# Patient Record
Sex: FEMALE | Race: ASIAN | NOT HISPANIC OR LATINO | Employment: FULL TIME | URBAN - METROPOLITAN AREA
[De-identification: names, ages, dates, MRNs, and addresses within clinical notes are randomized per-mention and may not be internally consistent; named-entity substitution may affect disease eponyms.]

---

## 2018-05-31 ENCOUNTER — OFFICE VISIT CONVERTED (OUTPATIENT)
Dept: FAMILY MEDICINE CLINIC | Facility: CLINIC | Age: 43
End: 2018-05-31
Attending: NURSE PRACTITIONER

## 2018-07-13 ENCOUNTER — CONVERSION ENCOUNTER (OUTPATIENT)
Dept: FAMILY MEDICINE CLINIC | Facility: CLINIC | Age: 43
End: 2018-07-13

## 2018-07-13 ENCOUNTER — OFFICE VISIT CONVERTED (OUTPATIENT)
Dept: FAMILY MEDICINE CLINIC | Facility: CLINIC | Age: 43
End: 2018-07-13
Attending: NURSE PRACTITIONER

## 2018-07-24 ENCOUNTER — CONVERSION ENCOUNTER (OUTPATIENT)
Dept: MAMMOGRAPHY | Facility: HOSPITAL | Age: 43
End: 2018-07-24

## 2019-02-08 ENCOUNTER — HOSPITAL ENCOUNTER (OUTPATIENT)
Dept: ONCOLOGY | Facility: HOSPITAL | Age: 44
Discharge: HOME | End: 2019-02-08
Attending: NURSE PRACTITIONER

## 2019-02-08 LAB
ALBUMIN SERPL-MCNC: 4.2 G/DL (ref 3.5–5)
ALBUMIN/GLOB SERPL: 1.4 {RATIO} (ref 1.4–2.6)
ALP SERPL-CCNC: 80 U/L (ref 42–98)
ALT SERPL-CCNC: 17 U/L (ref 10–40)
ANION GAP SERPL CALC-SCNC: 17 MMOL/L (ref 8–19)
AST SERPL-CCNC: 16 U/L (ref 15–50)
BASOPHILS # BLD AUTO: 0.03 10*3/UL (ref 0–0.2)
BASOPHILS NFR BLD AUTO: 0.35 % (ref 0–3)
BILIRUB SERPL-MCNC: 0.21 MG/DL (ref 0.2–1.3)
BUN SERPL-MCNC: 6 MG/DL (ref 5–25)
BUN/CREAT SERPL: 11 {RATIO} (ref 6–20)
CALCIUM SERPL-MCNC: 8.9 MG/DL (ref 8.7–10.4)
CHLORIDE SERPL-SCNC: 105 MMOL/L (ref 99–111)
CONV CO2: 23 MMOL/L (ref 22–32)
CONV TOTAL PROTEIN: 7.2 G/DL (ref 6.3–8.2)
CREAT UR-MCNC: 0.54 MG/DL (ref 0.5–0.9)
EOSINOPHIL # BLD AUTO: 0.35 10*3/UL (ref 0–0.7)
EOSINOPHIL # BLD AUTO: 3.58 % (ref 0–7)
ERYTHROCYTE [DISTWIDTH] IN BLOOD BY AUTOMATED COUNT: 11.9 % (ref 11.5–14.5)
GFR SERPLBLD BASED ON 1.73 SQ M-ARVRAT: >60 ML/MIN/{1.73_M2}
GLOBULIN UR ELPH-MCNC: 3 G/DL (ref 2–3.5)
GLUCOSE SERPL-MCNC: 95 MG/DL (ref 65–99)
HBA1C MFR BLD: 13.5 G/DL (ref 12–16)
HCT VFR BLD AUTO: 39.5 % (ref 37–47)
LDH SERPL-CCNC: 161 U/L (ref 120–240)
LYMPHOCYTES # BLD AUTO: 3.49 10*3/UL (ref 1–5)
MCH RBC QN AUTO: 30.3 PG (ref 27–31)
MCHC RBC AUTO-ENTMCNC: 34.3 G/DL (ref 33–37)
MCV RBC AUTO: 88.4 FL (ref 81–99)
MONOCYTES # BLD AUTO: 0.58 10*3/UL (ref 0.2–1.2)
MONOCYTES NFR BLD AUTO: 5.99 % (ref 3–10)
NEUTROPHILS # BLD AUTO: 5.17 10*3/UL (ref 2–8)
NEUTROPHILS NFR BLD AUTO: 53.7 % (ref 30–85)
NRBC BLD AUTO-RTO: 0 % (ref 0–0.01)
OSMOLALITY SERPL CALC.SUM OF ELEC: 289 MOSM/KG (ref 273–304)
PLATELET # BLD AUTO: 366 10*3/UL (ref 130–400)
PMV BLD AUTO: 7 FL (ref 7.4–10.4)
POTASSIUM SERPL-SCNC: 3.6 MMOL/L (ref 3.5–5.3)
RBC # BLD AUTO: 4.47 10*6/UL (ref 4.2–5.4)
SODIUM SERPL-SCNC: 141 MMOL/L (ref 135–147)
VARIANT LYMPHS NFR BLD MANUAL: 36.3 % (ref 20–45)
WBC # BLD AUTO: 9.62 10*3/UL (ref 4.8–10.8)

## 2019-07-31 ENCOUNTER — HOSPITAL ENCOUNTER (OUTPATIENT)
Dept: FAMILY MEDICINE CLINIC | Facility: CLINIC | Age: 44
Discharge: HOME | End: 2019-07-31
Attending: NURSE PRACTITIONER

## 2019-07-31 ENCOUNTER — OFFICE VISIT CONVERTED (OUTPATIENT)
Dept: FAMILY MEDICINE CLINIC | Facility: CLINIC | Age: 44
End: 2019-07-31
Attending: NURSE PRACTITIONER

## 2019-07-31 LAB
ALBUMIN SERPL-MCNC: 4.3 G/DL (ref 3.5–5)
ALBUMIN/GLOB SERPL: 1.5 {RATIO} (ref 1.4–2.6)
ALP SERPL-CCNC: 107 U/L (ref 42–98)
ALT SERPL-CCNC: 59 U/L (ref 10–40)
ANION GAP SERPL CALC-SCNC: 22 MMOL/L (ref 8–19)
AST SERPL-CCNC: 38 U/L (ref 15–50)
BASOPHILS # BLD AUTO: 0.04 10*3/UL (ref 0–0.2)
BASOPHILS NFR BLD AUTO: 0.4 % (ref 0–3)
BILIRUB SERPL-MCNC: 0.37 MG/DL (ref 0.2–1.3)
BUN SERPL-MCNC: 9 MG/DL (ref 5–25)
BUN/CREAT SERPL: 15 {RATIO} (ref 6–20)
CALCIUM SERPL-MCNC: 9.1 MG/DL (ref 8.7–10.4)
CHLORIDE SERPL-SCNC: 101 MMOL/L (ref 99–111)
CHOLEST SERPL-MCNC: 225 MG/DL (ref 107–200)
CHOLEST/HDLC SERPL: 5.9 {RATIO} (ref 3–6)
CONV ABS IMM GRAN: 0.02 10*3/UL (ref 0–0.2)
CONV CO2: 22 MMOL/L (ref 22–32)
CONV IMMATURE GRAN: 0.2 % (ref 0–1.8)
CONV TOTAL PROTEIN: 7.2 G/DL (ref 6.3–8.2)
CREAT UR-MCNC: 0.6 MG/DL (ref 0.5–0.9)
DEPRECATED RDW RBC AUTO: 43.7 FL (ref 36.4–46.3)
EOSINOPHIL # BLD AUTO: 0.39 10*3/UL (ref 0–0.7)
EOSINOPHIL # BLD AUTO: 4.4 % (ref 0–7)
ERYTHROCYTE [DISTWIDTH] IN BLOOD BY AUTOMATED COUNT: 13.2 % (ref 11.7–14.4)
GFR SERPLBLD BASED ON 1.73 SQ M-ARVRAT: >60 ML/MIN/{1.73_M2}
GLOBULIN UR ELPH-MCNC: 2.9 G/DL (ref 2–3.5)
GLUCOSE SERPL-MCNC: 89 MG/DL (ref 65–99)
HBA1C MFR BLD: 12.5 G/DL (ref 12–16)
HCT VFR BLD AUTO: 38 % (ref 37–47)
HDLC SERPL-MCNC: 38 MG/DL (ref 40–60)
IRON SATN MFR SERPL: 24 % (ref 20–55)
IRON SERPL-MCNC: 87 UG/DL (ref 60–170)
LDLC SERPL CALC-MCNC: 165 MG/DL (ref 70–100)
LYMPHOCYTES # BLD AUTO: 3.16 10*3/UL (ref 1–5)
MCH RBC QN AUTO: 29.7 PG (ref 27–31)
MCHC RBC AUTO-ENTMCNC: 32.9 G/DL (ref 33–37)
MCV RBC AUTO: 90.3 FL (ref 81–99)
MONOCYTES # BLD AUTO: 0.61 10*3/UL (ref 0.2–1.2)
MONOCYTES NFR BLD AUTO: 6.9 % (ref 3–10)
NEUTROPHILS # BLD AUTO: 4.68 10*3/UL (ref 2–8)
NEUTROPHILS NFR BLD AUTO: 52.6 % (ref 30–85)
NRBC CBCN: 0 % (ref 0–0.7)
OSMOLALITY SERPL CALC.SUM OF ELEC: 290 MOSM/KG (ref 273–304)
PLATELET # BLD AUTO: 360 10*3/UL (ref 130–400)
PMV BLD AUTO: 10.7 FL (ref 9.4–12.3)
POTASSIUM SERPL-SCNC: 3.9 MMOL/L (ref 3.5–5.3)
RBC # BLD AUTO: 4.21 10*6/UL (ref 4.2–5.4)
SODIUM SERPL-SCNC: 141 MMOL/L (ref 135–147)
T4 FREE SERPL-MCNC: 1.1 NG/DL (ref 0.9–1.8)
TIBC SERPL-MCNC: 365 UG/DL (ref 245–450)
TRANSFERRIN SERPL-MCNC: 255 MG/DL (ref 250–380)
TRIGL SERPL-MCNC: 110 MG/DL (ref 40–150)
TSH SERPL-ACNC: 2.29 M[IU]/L (ref 0.27–4.2)
VARIANT LYMPHS NFR BLD MANUAL: 35.5 % (ref 20–45)
VLDLC SERPL-MCNC: 22 MG/DL (ref 5–37)
WBC # BLD AUTO: 8.9 10*3/UL (ref 4.8–10.8)

## 2019-08-07 ENCOUNTER — HOSPITAL ENCOUNTER (OUTPATIENT)
Dept: GENERAL RADIOLOGY | Facility: HOSPITAL | Age: 44
Discharge: HOME | End: 2019-08-07
Attending: NURSE PRACTITIONER

## 2019-08-09 ENCOUNTER — HOSPITAL ENCOUNTER (OUTPATIENT)
Dept: GENERAL RADIOLOGY | Facility: HOSPITAL | Age: 44
Discharge: HOME | End: 2019-08-09
Attending: NURSE PRACTITIONER

## 2019-08-14 ENCOUNTER — OFFICE VISIT CONVERTED (OUTPATIENT)
Dept: ONCOLOGY | Facility: HOSPITAL | Age: 44
End: 2019-08-14
Attending: NURSE PRACTITIONER

## 2019-08-14 ENCOUNTER — HOSPITAL ENCOUNTER (OUTPATIENT)
Dept: ONCOLOGY | Facility: HOSPITAL | Age: 44
Discharge: HOME | End: 2019-08-14
Attending: NURSE PRACTITIONER

## 2019-08-20 ENCOUNTER — HOSPITAL ENCOUNTER (OUTPATIENT)
Dept: MRI IMAGING | Facility: HOSPITAL | Age: 44
Discharge: HOME | End: 2019-08-20
Attending: NURSE PRACTITIONER

## 2019-09-16 ENCOUNTER — HOSPITAL ENCOUNTER (OUTPATIENT)
Dept: GENERAL RADIOLOGY | Facility: HOSPITAL | Age: 44
Discharge: HOME | End: 2019-09-16
Attending: NURSE PRACTITIONER

## 2019-10-31 ENCOUNTER — HOSPITAL ENCOUNTER (OUTPATIENT)
Dept: LAB | Facility: HOSPITAL | Age: 44
Discharge: HOME | End: 2019-10-31
Attending: NURSE PRACTITIONER

## 2019-10-31 LAB
CHOLEST SERPL-MCNC: 223 MG/DL (ref 107–200)
CHOLEST/HDLC SERPL: 5.6 {RATIO} (ref 3–6)
HDLC SERPL-MCNC: 40 MG/DL (ref 40–60)
LDLC SERPL CALC-MCNC: 154 MG/DL (ref 70–100)
TRIGL SERPL-MCNC: 144 MG/DL (ref 40–150)
VLDLC SERPL-MCNC: 29 MG/DL (ref 5–37)

## 2020-02-14 ENCOUNTER — HOSPITAL ENCOUNTER (OUTPATIENT)
Dept: LAB | Facility: HOSPITAL | Age: 45
Discharge: HOME | End: 2020-02-14
Attending: NURSE PRACTITIONER

## 2020-02-14 LAB
ALBUMIN SERPL-MCNC: 4 G/DL (ref 3.5–5)
ALBUMIN/GLOB SERPL: 1.6 {RATIO} (ref 1.4–2.6)
ALP SERPL-CCNC: 84 U/L (ref 42–98)
ALT SERPL-CCNC: 18 U/L (ref 10–40)
ANION GAP SERPL CALC-SCNC: 15 MMOL/L (ref 8–19)
AST SERPL-CCNC: 16 U/L (ref 15–50)
BASOPHILS # BLD AUTO: 0.02 10*3/UL (ref 0–0.2)
BASOPHILS NFR BLD AUTO: 0.2 % (ref 0–3)
BILIRUB SERPL-MCNC: 0.38 MG/DL (ref 0.2–1.3)
BUN SERPL-MCNC: 10 MG/DL (ref 5–25)
BUN/CREAT SERPL: 18 {RATIO} (ref 6–20)
CALCIUM SERPL-MCNC: 8.8 MG/DL (ref 8.7–10.4)
CHLORIDE SERPL-SCNC: 102 MMOL/L (ref 99–111)
CONV ABS IMM GRAN: 0.02 10*3/UL (ref 0–0.2)
CONV CO2: 24 MMOL/L (ref 22–32)
CONV IMMATURE GRAN: 0.2 % (ref 0–1.8)
CONV TOTAL PROTEIN: 6.5 G/DL (ref 6.3–8.2)
CREAT UR-MCNC: 0.57 MG/DL (ref 0.5–0.9)
DEPRECATED RDW RBC AUTO: 45.1 FL (ref 36.4–46.3)
EOSINOPHIL # BLD AUTO: 0.34 10*3/UL (ref 0–0.7)
EOSINOPHIL # BLD AUTO: 3.9 % (ref 0–7)
ERYTHROCYTE [DISTWIDTH] IN BLOOD BY AUTOMATED COUNT: 13.5 % (ref 11.7–14.4)
GFR SERPLBLD BASED ON 1.73 SQ M-ARVRAT: >60 ML/MIN/{1.73_M2}
GLOBULIN UR ELPH-MCNC: 2.5 G/DL (ref 2–3.5)
GLUCOSE SERPL-MCNC: 96 MG/DL (ref 65–99)
HCT VFR BLD AUTO: 39 % (ref 37–47)
HGB BLD-MCNC: 12.5 G/DL (ref 12–16)
LDH SERPL-CCNC: 134 U/L (ref 120–240)
LYMPHOCYTES # BLD AUTO: 3.45 10*3/UL (ref 1–5)
LYMPHOCYTES NFR BLD AUTO: 39.4 % (ref 20–45)
MCH RBC QN AUTO: 29 PG (ref 27–31)
MCHC RBC AUTO-ENTMCNC: 32.1 G/DL (ref 33–37)
MCV RBC AUTO: 90.5 FL (ref 81–99)
MONOCYTES # BLD AUTO: 0.64 10*3/UL (ref 0.2–1.2)
MONOCYTES NFR BLD AUTO: 7.3 % (ref 3–10)
NEUTROPHILS # BLD AUTO: 4.29 10*3/UL (ref 2–8)
NEUTROPHILS NFR BLD AUTO: 49 % (ref 30–85)
NRBC CBCN: 0 % (ref 0–0.7)
OSMOLALITY SERPL CALC.SUM OF ELEC: 283 MOSM/KG (ref 273–304)
PLATELET # BLD AUTO: 328 10*3/UL (ref 130–400)
PMV BLD AUTO: 10.6 FL (ref 9.4–12.3)
POTASSIUM SERPL-SCNC: 4.1 MMOL/L (ref 3.5–5.3)
RBC # BLD AUTO: 4.31 10*6/UL (ref 4.2–5.4)
SODIUM SERPL-SCNC: 137 MMOL/L (ref 135–147)
WBC # BLD AUTO: 8.76 10*3/UL (ref 4.8–10.8)

## 2020-02-17 ENCOUNTER — HOSPITAL ENCOUNTER (OUTPATIENT)
Dept: ONCOLOGY | Facility: HOSPITAL | Age: 45
Discharge: HOME | End: 2020-02-17
Attending: NURSE PRACTITIONER

## 2020-02-17 ENCOUNTER — OFFICE VISIT CONVERTED (OUTPATIENT)
Dept: ONCOLOGY | Facility: HOSPITAL | Age: 45
End: 2020-02-17
Attending: NURSE PRACTITIONER

## 2020-03-16 ENCOUNTER — HOSPITAL ENCOUNTER (OUTPATIENT)
Dept: GENERAL RADIOLOGY | Facility: HOSPITAL | Age: 45
Discharge: HOME | End: 2020-03-16
Attending: NURSE PRACTITIONER

## 2020-07-27 ENCOUNTER — HOSPITAL ENCOUNTER (OUTPATIENT)
Dept: FAMILY MEDICINE CLINIC | Facility: CLINIC | Age: 45
Discharge: HOME | End: 2020-07-27
Attending: NURSE PRACTITIONER

## 2020-07-27 ENCOUNTER — OFFICE VISIT CONVERTED (OUTPATIENT)
Dept: FAMILY MEDICINE CLINIC | Facility: CLINIC | Age: 45
End: 2020-07-27
Attending: NURSE PRACTITIONER

## 2020-07-27 LAB
ALBUMIN SERPL-MCNC: 4.2 G/DL (ref 3.5–5)
ALBUMIN/GLOB SERPL: 1.4 {RATIO} (ref 1.4–2.6)
ALP SERPL-CCNC: 103 U/L (ref 42–98)
ALT SERPL-CCNC: 21 U/L (ref 10–40)
ANION GAP SERPL CALC-SCNC: 17 MMOL/L (ref 8–19)
AST SERPL-CCNC: 20 U/L (ref 15–50)
BILIRUB SERPL-MCNC: 0.34 MG/DL (ref 0.2–1.3)
BUN SERPL-MCNC: 8 MG/DL (ref 5–25)
BUN/CREAT SERPL: 12 {RATIO} (ref 6–20)
CALCIUM SERPL-MCNC: 9.6 MG/DL (ref 8.7–10.4)
CHLORIDE SERPL-SCNC: 102 MMOL/L (ref 99–111)
CHOLEST SERPL-MCNC: 210 MG/DL (ref 107–200)
CHOLEST/HDLC SERPL: 6 {RATIO} (ref 3–6)
CONV CO2: 26 MMOL/L (ref 22–32)
CONV TOTAL PROTEIN: 7.3 G/DL (ref 6.3–8.2)
CREAT UR-MCNC: 0.67 MG/DL (ref 0.5–0.9)
GFR SERPLBLD BASED ON 1.73 SQ M-ARVRAT: >60 ML/MIN/{1.73_M2}
GLOBULIN UR ELPH-MCNC: 3.1 G/DL (ref 2–3.5)
GLUCOSE SERPL-MCNC: 86 MG/DL (ref 65–99)
HDLC SERPL-MCNC: 35 MG/DL (ref 40–60)
LDLC SERPL CALC-MCNC: 152 MG/DL (ref 70–100)
OSMOLALITY SERPL CALC.SUM OF ELEC: 290 MOSM/KG (ref 273–304)
POTASSIUM SERPL-SCNC: 3.9 MMOL/L (ref 3.5–5.3)
SODIUM SERPL-SCNC: 141 MMOL/L (ref 135–147)
TRIGL SERPL-MCNC: 116 MG/DL (ref 40–150)
VLDLC SERPL-MCNC: 23 MG/DL (ref 5–37)

## 2020-09-16 ENCOUNTER — HOSPITAL ENCOUNTER (OUTPATIENT)
Dept: GENERAL RADIOLOGY | Facility: HOSPITAL | Age: 45
Discharge: HOME | End: 2020-09-16
Attending: NURSE PRACTITIONER

## 2020-09-21 ENCOUNTER — OFFICE VISIT CONVERTED (OUTPATIENT)
Dept: ORTHOPEDIC SURGERY | Facility: CLINIC | Age: 45
End: 2020-09-21
Attending: STUDENT IN AN ORGANIZED HEALTH CARE EDUCATION/TRAINING PROGRAM

## 2020-10-30 ENCOUNTER — OFFICE VISIT CONVERTED (OUTPATIENT)
Dept: ORTHOPEDIC SURGERY | Facility: CLINIC | Age: 45
End: 2020-10-30
Attending: STUDENT IN AN ORGANIZED HEALTH CARE EDUCATION/TRAINING PROGRAM

## 2020-11-18 ENCOUNTER — OFFICE VISIT CONVERTED (OUTPATIENT)
Dept: ORTHOPEDIC SURGERY | Facility: CLINIC | Age: 45
End: 2020-11-18
Attending: STUDENT IN AN ORGANIZED HEALTH CARE EDUCATION/TRAINING PROGRAM

## 2021-02-04 ENCOUNTER — OFFICE VISIT CONVERTED (OUTPATIENT)
Dept: FAMILY MEDICINE CLINIC | Facility: CLINIC | Age: 46
End: 2021-02-04
Attending: NURSE PRACTITIONER

## 2021-02-18 ENCOUNTER — OFFICE VISIT CONVERTED (OUTPATIENT)
Dept: ONCOLOGY | Facility: HOSPITAL | Age: 46
End: 2021-02-18
Attending: NURSE PRACTITIONER

## 2021-05-10 NOTE — H&P
History and Physical      Patient Name: Carol Giron   Patient ID: [id number]   Sex: Female   YOB: 1975    Primary Care Provider: Sindy JACQUES   Referring Provider: Tisha Lala PA-C    Visit Date: September 21, 2020    Provider: Clinton Albarran MD   Location: [facility]   Location Address: [address] [place] [address]  [id number]   Location Phone: [phone]          Chief Complaint  · left elbow pain      History Of Present Illness  Carol Giron is a 45 year old /White female who presents today to [facility].      Carol presents today with approximately 2-month history of left elbow pain.  She denies any injury prior to the onset of her pain.  She is right-hand dominant.  She does predominantly computer work and denies any recent changes in her work or job requirements.  Her pain is located over the lateral aspect of the left elbow/lateral epicondyle.  She initially experienced pain primarily with activities but is now having pain at rest as well.  She saw her primary care doctor.  She was prescribed topical Voltaren gel and given a offloading type brace.  She was also given a steroid Dosepak initially because she was also having wrist pain/carpal tunnel type symptoms.  Her wrist pain has improved but her elbow pain has not.  She tries to avoid anti-inflammatories because she has GI issues in the past.  She has not had any injections.  She denies any radiating or radicular type symptoms.       Past Medical History  Allergic rhinitis, chronic; Bitten or stung by nonvenomous insect and other nonvenomous arthropods, initial encounter; History of melanoma; Hyperlipidemia; IBS (irritable bowel syndrome); Limb Pain; Limb Swelling; Multinodular goiter; Pap smear for cervical cancer screening; Screening Mammogram         Past Surgical History  Arthroscopic debridement of meniscus of right knee; Colonoscopy; Cyst Removal; Tubal ligation         Medication  "List  cetirizine 10 mg oral tablet; hydrochlorothiazide 25 mg oral tablet; Medrol (Ramu) 4 mg oral tablets,dose pack; Singulair 10 mg oral tablet; Voltaren 1 % topical gel         Allergy List  NO KNOWN DRUG ALLERGIES       Allergies Reconciled  Family Medical History  Diabetes, unspecified type; Cardiovascular disease         Social History  Alcohol (Current some day); Caffeine (Current every day); Second hand smoke exposure (Current some day); Tobacco (Current every day)         Immunizations  Name Date Admin   Hepatitis A    Influenza          Review of Systems  · Constitutional  o Denies  o : fever, chills, weight loss  · Cardiovascular  o Denies  o : chest pain, shortness of breath  · Gastrointestinal  o Denies  o : liver disease, heartburn, nausea, blood in stools  · Genitourinary  o Denies  o : painful urination, blood in urine  · Integument  o Denies  o : rash, itching  · Neurologic  o Denies  o : headache, weakness, loss of consciousness  · Musculoskeletal  o Admits  o : Left elbow pain  · Psychiatric  o Denies  o : drug/alcohol addiction, anxiety, depression      Vitals  Date Time BP Position Site L\R Cuff Size HR RR TEMP (F) WT  HT  BMI kg/m2 BSA m2 O2 Sat        09/21/2020 10:16 AM      66 - R   182lbs 5oz 5'  4\" 31.29 1.93 98 %          Physical Examination  · Constitutional  o Appearance  o : well developed, well-nourished, no obvious deformities present  · Head and Face  o Head  o :   § Inspection  § : normocephalic  o Face  o :   § Inspection  § : no facial lesions  · Eyes  o Conjunctivae  o : conjunctivae normal  o Sclerae  o : sclerae white  · Ears, Nose, Mouth and Throat  o Ears  o :   § External Ears  § : appearance within normal limits  § Hearing  § : intact  o Nose  o :   § External Nose  § : appearance normal  · Neck  o Inspection/Palpation  o : normal appearance  o Range of Motion  o : full range of motion  · Respiratory  o Respiratory Effort  o : breathing unlabored  o Inspection of " Chest  o : normal appearance  o Auscultation of Lungs  o : no audible wheezing or rales  · Cardiovascular  o Heart  o : regular rate  · Gastrointestinal  o Abdominal Examination  o : soft and non-tender  · Skin and Subcutaneous Tissue  o General Inspection  o : intact, no rashes  · Psychiatric  o General  o : Alert and oriented x3  o Judgement and Insight  o : judgment and insight intact  o Mood and Affect  o : mood normal, affect appropriate  · In Office Procedures  o View  o : AP/LATERAL  o Site  o : left, elbow   o Indication  o : left elbow pain  o Study  o : X-rays ordered, taken in the office, and reviewed today.  o Xray  o : 2 views of the left elbow obtained and interpreted by me on this date. Well-maintained joint space on the AP. The lateral view is not rotated appropriately to visualize the joint space. No fractures or dislocations appreciated.  o Comparative Data  o : No comparative data found  · Left Elbow  o Left Elbow  o : No swelling or bruising noted. Tender to palpation over the lateral epicondyle and common extensor tendon. Nontender over the olecranon, medial epicondyle, or radial head. Full elbow motion with flexion extension pronation supination. No pain with palpation or gentle motion about the wrist. Pain/weakness with resisted wrist extension localized over the lateral epicondyle. Sensation intact light touch in the median radial and ulnar nerve distributions distally. AIN PIN ulnar motor function intact. Palpable radial pulse. No pain with passive shoulder motion.          Assessment  · Lateral epicondylitis     726.32/M77.10  Carol has left lateral epicondylitis. This is been present for 2 months and not responded to a steroid Dosepak, activity modification, topical Voltaren gel, and a offloading strap. No arthritic change or other pathology is noted on her left elbow x-rays obtained today. We discussed that nonoperative treatment measures should help improve her symptoms. These consist  of continued anti-inflammatory use, physical therapy, and possibly an injection. She would like to try physical therapy first. We will prescribe that for her today. She would like to follow-up on an as-needed basis and will call me if there are any issues going forward.  · Left elbow pain     719.42/M25.522      Plan  · Orders  o Elbow (Left) 2 views X-Ray [facility] ([id number]) - 719.42/M25.522 - 09/21/2020  · Medications  o Medications have been Reconciled  o Transition of Care or Provider Policy  · Instructions  o X-rays reviewed by Dr. Albarran.  o Reviewed the patient's Past Medical, Social, and Family history as well as the ROS at today's visit, no changes.  o Call or return if worsening symptoms.  o Follow Up PRN.  · Disposition  o Call or Return if symptoms worsen or persist.            Electronically Signed by: Clinton Albarran MD -Author on September 21, 2020 04:12:43 PM

## 2021-05-13 NOTE — PROGRESS NOTES
Progress Note      Patient Name: Carol Giron   Patient ID: [id number]   Sex: Female   YOB: 1975    Primary Care Provider: Sindy JACQUES   Referring Provider: Tisha Lala PA-C    Visit Date: October 30, 2020    Provider: Clinton Albarran MD   Location: [facility]   Location Address: [address] [place] [address]  [id number]   Location Phone: [phone]          Chief Complaint  · Left elbow pain       History Of Present Illness  Carol Giron is a 45 year old /White female who presents today to [facility].      Carol returns today for follow-up of her left elbow.  She is still having lateral sided elbow pain.  She has been doing physical therapy.  She states some of the physical therapy stretching and strengthening exercises are helpful.  However the elbow feels very irritated with other activities.  She does describe new pain more posterior and lateral in addition to her continued pain more localized at the lateral epicondyle.  She also feels some tightness as the elbows transitions to extension.  She denies any numbness or tingling.  She denies any pain in the forearm, wrist, hand.  She has not been taking any anti-inflammatory medications.           Past Medical History  Allergic rhinitis, chronic; Bitten or stung by nonvenomous insect and other nonvenomous arthropods, initial encounter; History of melanoma; Hyperlipidemia; IBS (irritable bowel syndrome); Limb Pain; Limb Swelling; Multinodular goiter; Pap smear for cervical cancer screening; Screening Mammogram         Past Surgical History  Arthroscopic debridement of meniscus of right knee; Colonoscopy; Cyst Removal; Tubal ligation         Medication List  cetirizine 10 mg oral tablet; hydrochlorothiazide 25 mg oral tablet; Medrol (Ramu) 4 mg oral tablets,dose pack; Mobic 7.5 mg oral tablet; Singulair 10 mg oral tablet; Voltaren 1 % topical gel         Allergy List  NO KNOWN DRUG ALLERGIES  "      Allergies Reconciled  Family Medical History  Diabetes, unspecified type; Cardiovascular disease         Social History  Alcohol (Current some day); Caffeine (Current every day); Second hand smoke exposure (Current some day); Tobacco (Current every day)         Immunizations  Name Date Admin   Hepatitis A 05/31/2018   Influenza 09/26/2016         Review of Systems  · Constitutional  o Denies  o : fever, chills, weight loss  · Cardiovascular  o Denies  o : chest pain, shortness of breath  · Gastrointestinal  o Denies  o : liver disease, heartburn, nausea, blood in stools  · Genitourinary  o Denies  o : painful urination, blood in urine  · Integument  o Denies  o : rash, itching  · Neurologic  o Denies  o : headache, weakness, loss of consciousness  · Musculoskeletal  o Admits  o : Left elbow pain  · Psychiatric  o Denies  o : drug/alcohol addiction, anxiety, depression      Vitals  Date Time BP Position Site L\R Cuff Size HR RR TEMP (F) WT  HT  BMI kg/m2 BSA m2 O2 Sat FR L/min FiO2        10/30/2020 01:44 PM      82 - R   182lbs 4oz 5'  3\" 32.28 1.92 98 %            Physical Examination  · Constitutional  o Appearance  o : well developed, well-nourished, no obvious deformities present  · Head and Face  o Head  o :   § Inspection  § : normocephalic  o Face  o :   § Inspection  § : no facial lesions  · Eyes  o Conjunctivae  o : conjunctivae normal  o Sclerae  o : sclerae white  · Ears, Nose, Mouth and Throat  o Ears  o :   § External Ears  § : appearance within normal limits  § Hearing  § : intact  o Nose  o :   § External Nose  § : appearance normal  · Neck  o Inspection/Palpation  o : normal appearance  o Range of Motion  o : full range of motion  · Respiratory  o Respiratory Effort  o : breathing unlabored  o Inspection of Chest  o : normal appearance  o Auscultation of Lungs  o : no audible wheezing or rales  · Cardiovascular  o Heart  o : regular rate  · Gastrointestinal  o Abdominal Examination  o : soft " and non-tender  · Skin and Subcutaneous Tissue  o General Inspection  o : intact, no rashes  · Psychiatric  o General  o : Alert and oriented x3  o Judgement and Insight  o : judgment and insight intact  o Mood and Affect  o : mood normal, affect appropriate  · Left Arm  o Inspection  o : No swelling or bruising. Point tender to palpation of the lateral epicondyle and adjacent common extensor tendon. Point tenderness to palpation over the joint capsule/radial head. Nontender over the olecranon. Nontender over the medial epicondyle. She demonstrates full elbow extension, but there is pain and hesitancy through the terminal 5 to 10 degrees of motion. She is able to demonstrate full elbow flexion without pain. Full elbow pronation supination is also demonstrated but this is again painful through the terminal arc of motion. No mechanical symptoms appreciable with passive motion. Sensation intact light distally in the median, radial, ulnar nerve distributions. There is pain localized at the lateral epicondyle with resisted wrist extension. Motor function otherwise intact in the AIN, PIN, ulnar nerve distributions. She has palpable radial pulse and well-perfused hand.          Assessment  · Lateral epicondylitis     726.32/M77.10  · Left elbow pain     719.42/M25.522      Plan  · Medications  o Medications have been Reconciled  o Transition of Care or Provider Policy  · Instructions  o Reviewed the patient's Past Medical, Social, and Family history as well as the ROS at today's visit, no changes.  o Call or return if worsening symptoms.  o Follow Up in 2 weeks.  o The above service was scribed by Vandana Bell on my behalf and I attest to the accuracy of the note. cb  shirin Medina has left elbow lateral epicondylitis. She also seems to have developed left elbow synovitis and more joint related pain during her physical therapy. Given her increasing pain and new symptoms with therapy, I think it is best that we hold off on  any further formal physical therapy at this point. I instructed her to continue her light range of motion type exercises. She should ice, and elevate the elbow. I will prescribe Mobic 7.5 mg daily to help with her inflammation. I reviewed her previously obtained x-rays today and these did not reveal any degenerative pathology within the elbow. She has not had any trauma. We discussed this brief period of rest and supportive measures as noted above. I will follow-up with her in 2 weeks time for repeat evaluation. She was agreeable with the plan.            Electronically Signed by: Clinton Albarran MD -Author on October 30, 2020 05:46:26 PM

## 2021-05-13 NOTE — PROGRESS NOTES
Progress Note      Patient Name: Carol Giron   Patient ID: [id number]   Sex: Female   YOB: 1975    Primary Care Provider: Sindy JACQUES   Referring Provider: Tisha Lala PA-C    Visit Date: July 27, 2020    Provider: GEORGIE Ponce   Location: [facility]   Location Address: [address] [place] [address]  [id number]   Location Phone: [phone]          Chief Complaint  · 1 Year Follow Up      History Of Present Illness  Carol Giron is a 45 year old /White female who presents for evaluation and treatment of:      1 Year follow up   Lab orders pending to check CMP/Lipid  Last lab work done 7/31/19  Med refills needed    edema in RLE improved with water pill. pt has hx of melanoma of RLE with lymph node removal.    pt c/o wrist and elbow pain on right.     flu shot- 10/17/19  Mammo- Dx mammo- 3/16/20 and 6 month repeat Dx Mammo of Lt breast scheduled 9/16/20  Colon- 12/8/06  Pap- 8/2019 Dr. Wall           Past Medical History  Disease Name Date Onset Notes   Allergic rhinitis, chronic --  --    Bitten or stung by nonvenomous insect and other nonvenomous arthropods, initial encounter 05/31/2018 --    History of melanoma 07/13/2018 --    Hyperlipidemia --  --    IBS (irritable bowel syndrome) --  --    Limb Pain --  --    Limb Swelling --  --    Multinodular goiter 07/13/2018 --    Pap smear for cervical cancer screening 8/2019 Dr. Wall   Screening Mammogram 8/24/18 Dx Mammo of Lt Breast 3/16/20         Past Surgical History  Procedure Name Date Notes   Arthroscopic debridement of meniscus of right knee --  --    Colonoscopy 12/8/2006 --    Cyst Removal --  --    Tubal ligation --  --          Medication List  Name Date Started Instructions   hydrochlorothiazide 25 mg oral tablet 07/27/2020 take 1 tablet by oral route once a day (in the morning) for 30 days   loratadine 10 mg oral tablet  take 1 tablet (10 mg) by oral route once daily    Singulair 10 mg oral tablet 07/27/2020 take 1 tablet (10 mg) by oral route once daily at bedtime   Voltaren 1 % topical gel 07/27/2020 apply 2 grams to the affected area(s) by topical route 4 times per day         Allergy List  Allergen Name Date Reaction Notes   NO KNOWN DRUG ALLERGIES --  --  --          Family Medical History  Disease Name Relative/Age Notes   Diabetes, unspecified type Grandmother (paternal)/   Grandmother (paternal)   Cardiovascular disease Grandfather (paternal)/  Grandmother (paternal)/   unknown age later in life 60's         Social History  Finding Status Start/Stop Quantity Notes   Alcohol Current some day 0/0 --  drinks rarely   Caffeine Current every day 0/0 --  drinks regularly; coffee; 3-4 times per day   Second hand smoke exposure Current some day 0/0 --  yes   Tobacco Current every day 16/0 1/2 ppd current everyday smoker; started smoking at age 15         Immunizations  NameDate Admin Mfg Trade Name Lot Number Route Inj VIS Given VIS Publication   Hepatitis A05/31/2018 SK[employer] HAVRIX-ADULT  IM LA 05/31/2018 10/25/2011   Comments: Pt tolerated injection well with no adverse reaction.   Nnaelabon76/26/2016 SKB Fluarix, quadrivalent, preservative free [id number] IM LD 09/26/2016 07/02/2012   Comments: tolerated well. Left office in stable condition.         Review of Systems  · Constitutional  o Denies  o : fatigue, fever, weight gain, weight loss, chills  · Cardiovascular  o Admits  o : edema (swelling)  o Denies  o : chest Pain, palpitations  · Respiratory  o Denies  o : frequent cough, shortness of breath  · Gastrointestinal  o Admits  o : constipation  o Denies  o : nausea, vomiting, changes in bowel habits  · Genitourinary  o Denies  o : dysuria, urinary frequency, urinary urgency, polyuria  · Neurologic  o Denies  o : headache, tingling or numbness, dizziness  · Musculoskeletal  o Admits  o : elbow pain, wrist pain  o Denies  o : joint pain, myalgias  · Psychiatric  o Denies  o :  "mood changes, memory changes, SI/HI  · Allergic-Immunologic  o Admits  o : seasonal allergies  o Denies  o : eczema, urticaria      Vitals  Date Time BP Position Site L\R Cuff Size HR RR TEMP (F) WT  HT  BMI kg/m2 BSA m2 O2 Sat HC       07/31/2019 07:42 /65 Sitting    78 - R 20 98.1 179lbs 0oz 5'  4\" 30.72 1.91 98 %    07/31/2019 07:42 AM         178lbs 0oz 5'  4\" 30.55 1.91     07/27/2020 10:42 /67 Sitting    55 - R 18 98.7 180lbs 8oz 5'  4\" 30.98 1.92 98 %          Physical Examination  · Constitutional  o Appearance  o : well-nourished, in no acute distress  · Eyes  o Conjunctivae  o : conjunctivae normal  o Sclerae  o : sclerae white  o Pupils and Irises  o : pupils equal and round  o Eyelids/Ocular Adnexae  o : eyelid appearance normal, no exudates present  · Neck  o Inspection/Palpation  o : normal appearance, no masses or tenderness, trachea midline  o Range of Motion  o : cervical range of motion within normal limits  o Thyroid  o : gland size normal, nontender, no nodules or masses present on palpation  · Respiratory  o Respiratory Effort  o : breathing unlabored  o Inspection of Chest  o : normal appearance  o Auscultation of Lungs  o : normal breath sounds throughout inspiration and expiration  · Cardiovascular  o Heart  o :   § Auscultation of Heart  § : regular rate and rhythm, no murmurs, gallops or rubs  o Peripheral Vascular System  o :   § Carotid Arteries  § : normal pulses bilaterally, no bruits present  § Extremities  § : no clubbing or edema  · Gastrointestinal  o Abdominal Examination  o : abdomen nontender to palpation, tone normal without rigidity or guarding, no masses present, bowel sounds present  · Musculoskeletal  o Spine  o :   § Inspection/Palpation  § : no spinal tenderness, scoliosis or kyphosis present  § Range of Motion  § : spine range of motion normal  o Right Upper Extremity  o :   § Inspection/Palpation  § : tenderness to palpation present-elbow region, tenderness " to palpation present-wrist anterior wrist  · Skin and Subcutaneous Tissue  o General Inspection  o : no rashes or lesions present, no areas of discoloration  o Body Hair  o : hair normal for age, general body hair distribution normal for age  o Digits and Nails  o : no clubbing, cyanosis, deformities or edema present, normal appearing nails  · Neurologic  o Mental Status Examination  o :   § Orientation  § : grossly oriented to person, place and time  o Gait and Station  o : normal gait, able to stand without difficulty  · Psychiatric  o Judgement and Insight  o : judgment and insight intact  o Mood and Affect  o : mood normal, affect appropriate     positive phalens sign of right wrist, negative tinnels sign.           Assessment  · Allergic rhinitis due to allergen     477.9/J30.9  · Hyperlipidemia     272.4/E78.5  · Nicotine dependence     305.1/F17.200  · Carpal tunnel syndrome of left wrist     354.0/G56.02  wrist brace for 2 weeks except when showering.  · Lateral epicondylitis of left elbow     726.32/M77.12  bands  · Constipation     564.00/K59.00  increase water, fruits and veggies. miralax as directed.       Plan  · Orders  o ACO-17: Screened for tobacco use AND received tobacco cessation intervention (4004F) - 305.1/F17.200 - 07/27/2020  o ACO-39: Current medications updated and reviewed () - - 07/27/2020  o ACO-14: Influenza immunization administered or previously received () - - 07/27/2020  o ACO-20: Screening Mammography documented and reviewed () - - 07/27/2020  · Medications  o Medrol (Ramu) 4 mg oral tablets,dose pack   SIG: take as directed for 6 days   DISP: (1) 21 ct dose-pack with 0 refills  Prescribed on 07/27/2020     o Voltaren 1 % topical gel   SIG: apply to affected area(s) by topical route 4 times a day   DISP: (1) 100 gm tube with 1 refills  Prescribed on 07/27/2020     o cetirizine 10 mg oral tablet   SIG: take 1 tablet (10 mg) by oral route once daily for 90 days   DISP:  (90) tablets with 1 refills  Prescribed on 07/27/2020     o Singulair 10 mg oral tablet   SIG: take 1 tablet (10 mg) by oral route once daily at bedtime   DISP: (30) tablets with 5 refills  Adjusted on 07/27/2020     o hydrochlorothiazide 25 mg oral tablet   SIG: take 1 tablet by oral route once a day (in the morning) for 30 days   DISP: (30) tablet with 5 refills  Refilled on 07/27/2020     o Medications have been Reconciled  o Transition of Care or Provider Policy  · Instructions  o Advised that cheeses and other sources of dairy fats, animal fats, fast food, and the extras (candy, pastries, pies, doughnuts and cookies) all contain LDL raising nutrients. Advised to increase fruits, vegetables, whole grains, and to monitor portion sizes.   o *Form of nicotine being used: cigs  o Patient was strongly encouraged to discontinue use of any nicotine containing product or minimize the use of the product.  o Patient was educated/instructed on their diagnosis, treatment and medications prior to discharge from the clinic today.  o Call the office with any concerns or questions.  · Disposition  o Return to Office in 1 year.            Electronically Signed by: GEORGIE Ponce -Author on August 5, 2020 01:16:18 PM

## 2021-05-13 NOTE — PROGRESS NOTES
Progress Note      Patient Name: Carol Giron   Patient ID: [id number]   Sex: Female   YOB: 1975    Primary Care Provider: Sindy JACQUES   Referring Provider: Tisha Lala PA-C    Visit Date: November 18, 2020    Provider: Clinton Albarran MD   [facility]   Location Address: [address] [place] [address]  [id number]   Location Phone: [phone]          Chief Complaint  · left elbow pain      History Of Present Illness  Carol Giron is a 45 year old /White female who presents today to [facility].      Carol presents to the office today for a follow up of her left elbow.  She has been treated for left elbow lateral epicondylitis.  She was initially doing well with physical therapy, but had a flareup.  At our last visit we recommended stopping the PT and allowing the elbow to rest.  She has been taking oral anti-inflammatories and using topical Voltaren as well.  She states her pain is much improved today.  She has not experienced any new symptoms about the elbow. She denies any numbness or tingling distally. She has overall much improved since previous office visit.                     Past Medical History  Allergic rhinitis, chronic; Bitten or stung by nonvenomous insect and other nonvenomous arthropods, initial encounter; History of melanoma; Hyperlipidemia; IBS (irritable bowel syndrome); Limb Pain; Limb Swelling; Multinodular goiter; Pap smear for cervical cancer screening; Screening Mammogram         Past Surgical History  Arthroscopic debridement of meniscus of right knee; Colonoscopy; Cyst Removal; Tubal ligation         Medication List  cetirizine 10 mg oral tablet; hydrochlorothiazide 25 mg oral tablet; Mobic 7.5 mg oral tablet; Singulair 10 mg oral tablet; Voltaren 1 % topical gel         Allergy List  NO KNOWN DRUG ALLERGIES       Allergies Reconciled  Family Medical History  Diabetes, unspecified type; Cardiovascular disease  "        Social History  Alcohol (Current some day); Caffeine (Current every day); Second hand smoke exposure (Current some day); Tobacco (Current every day)         Immunizations  Name Date Admin   Hepatitis A 05/31/2018   Influenza 11/03/2020   Influenza 09/26/2016         Review of Systems  · Constitutional  o Denies  o : fever, chills, weight loss  · Cardiovascular  o Denies  o : chest pain, shortness of breath  · Gastrointestinal  o Denies  o : liver disease, heartburn, nausea, blood in stools  · Genitourinary  o Denies  o : painful urination, blood in urine  · Integument  o Denies  o : rash, itching  · Neurologic  o Denies  o : headache, weakness, loss of consciousness  · Musculoskeletal  o Admits  o : left elbow pain  · Psychiatric  o Denies  o : drug/alcohol addiction, anxiety, depression      Vitals  Date Time BP Position Site L\R Cuff Size HR RR TEMP (F) WT  HT  BMI kg/m2 BSA m2 O2 Sat FR L/min FiO2        11/18/2020 01:55 PM      89 - R   182lbs 5oz 5'  4\" 31.29 1.93 97 %            Physical Examination  · Constitutional  o Appearance  o : well developed, well-nourished, no obvious deformities present  · Head and Face  o Head  o :   § Inspection  § : normocephalic  o Face  o :   § Inspection  § : no facial lesions  · Eyes  o Conjunctivae  o : conjunctivae normal  o Sclerae  o : sclerae white  · Ears, Nose, Mouth and Throat  o Ears  o :   § External Ears  § : appearance within normal limits  § Hearing  § : intact  o Nose  o :   § External Nose  § : appearance normal  · Neck  o Inspection/Palpation  o : normal appearance  o Range of Motion  o : full range of motion  · Respiratory  o Respiratory Effort  o : breathing unlabored  o Inspection of Chest  o : normal appearance  o Auscultation of Lungs  o : no audible wheezing or rales  · Cardiovascular  o Heart  o : regular rate  · Gastrointestinal  o Abdominal Examination  o : soft and non-tender  · Skin and Subcutaneous Tissue  o General Inspection  o : " intact, no rashes  · Psychiatric  o General  o : Alert and oriented x3  o Judgement and Insight  o : judgment and insight intact  o Mood and Affect  o : mood normal, affect appropriate  · Left Arm  o Inspection  o : No swelling. No effusion or bruising. Point tender over common extensor tendon just distal to the lateral epicondyle. Non tender to palpation over the joint capsule/radial head today. Nontender over the olecranon. Nontender over the medial epicondyle. She demonstrates full elbow extension today. She is able to demonstrate full elbow flexion without pain. Full elbow pronation supination is also demonstrated. No mechanical symptoms appreciable with passive motion. Sensation intact light distally in the median, radial, ulnar nerve distributions. There is pain localized at the lateral epicondyle with resisted wrist extension. Motor function otherwise intact in the AIN, PIN, ulnar nerve distributions. She has palpable radial pulse and well-perfused hand.           Assessment  · Lateral epicondylitis     726.32/M77.10  · Left elbow pain     719.42/M25.522      Plan  · Medications  o Medications have been Reconciled  o Transition of Care or Provider Policy  · Instructions  o Reviewed the patient's Past Medical, Social, and Family history as well as the ROS at today's visit, no changes.  o Call or return if worsening symptoms.  o Follow Up PRN.  o The above service was scribed by Vandana Bell on my behalf and I attest to the accuracy of the note. annie Medina is now improving with her left lateral epicondylitis. We discussed that this can be a long recovery. We discussed that it can be frustrating at times and it is associated with periods of setback. She was understanding. We discussed treatment options. This would consist of continuing with anti-inflammatories and therapy exercises. We could consider an injection. The last line is surgical management but I would not recommend that at this point. She was  agreeable. She would like to do home exercises and continue with anti-inflammatory medications as needed. She will follow up with me on an as-needed basis going forward.            Electronically Signed by: Clinton Albarran MD -Author on November 20, 2020 01:40:47 PM

## 2021-05-14 VITALS
OXYGEN SATURATION: 100 % | RESPIRATION RATE: 18 BRPM | BODY MASS INDEX: 30.56 KG/M2 | HEIGHT: 64 IN | TEMPERATURE: 97.9 F | SYSTOLIC BLOOD PRESSURE: 126 MMHG | DIASTOLIC BLOOD PRESSURE: 58 MMHG | WEIGHT: 179 LBS | HEART RATE: 83 BPM

## 2021-05-14 VITALS — BODY MASS INDEX: 32.29 KG/M2 | OXYGEN SATURATION: 98 % | WEIGHT: 182.25 LBS | HEART RATE: 82 BPM | HEIGHT: 63 IN

## 2021-05-14 VITALS — HEIGHT: 64 IN | HEART RATE: 66 BPM | OXYGEN SATURATION: 98 % | WEIGHT: 182.31 LBS | BODY MASS INDEX: 31.12 KG/M2

## 2021-05-14 VITALS — OXYGEN SATURATION: 97 % | WEIGHT: 182.31 LBS | HEIGHT: 64 IN | BODY MASS INDEX: 31.12 KG/M2 | HEART RATE: 89 BPM

## 2021-05-14 NOTE — PROGRESS NOTES
Progress Note      Patient Name: Carol Giron   Patient ID: [id number]   Sex: Female   YOB: 1975    Primary Care Provider: Sindy JACQUES   Referring Provider: Tisha Lala PA-C    Visit Date: February 4, 2021    Provider: GEORGIE Ponce   Location: [facility]   Location Address: [address] [place], [place] [id number]   Location Phone: [phone]          Chief Complaint  · Acute Visit for Ear Pain      History Of Present Illness  Carol Giron is a 45 year old /White female who presents for evaluation and treatment of:      Acute Visit for Rt Ear Pain  Started yesterday with pain in the Jaw, felt like it had to pop.  Admits to increased stress. denies chewing ice or gum.     AR - controlled with medications.     edema - doing well, resolved with medication.              Past Medical History  Disease Name Date Onset Notes   Allergic rhinitis, chronic --  --    Bitten or stung by nonvenomous insect and other nonvenomous arthropods, initial encounter 05/31/2018 --    History of melanoma 07/13/2018 --    Hyperlipidemia --  --    IBS (irritable bowel syndrome) --  --    Limb Pain --  --    Limb Swelling --  --    Multinodular goiter 07/13/2018 --    Pap smear for cervical cancer screening 8/2019 Dr. Wall   Screening Mammogram 9/16/20 9/16/20- WNL Repeat in 1 yr Dx Mammo of Lt Breast 3/16/20         Past Surgical History  Procedure Name Date Notes   Arthroscopic debridement of meniscus of right knee --  --    Colonoscopy 12/8/2006 --    Cyst Removal --  --    Tubal ligation --  --          Medication List  Name Date Started Instructions   cetirizine 10 mg oral tablet 07/27/2020 take 1 tablet (10 mg) by oral route once daily for 90 days   hydrochlorothiazide 25 mg oral tablet 07/27/2020 take 1 tablet by oral route once a day (in the morning) for 30 days   Singulair 10 mg oral tablet 07/27/2020 take 1 tablet (10 mg) by oral route  once daily at bedtime   Voltaren 1 % topical gel 07/27/2020 apply 2 grams to the affected area(s) by topical route 4 times per day         Allergy List  Allergen Name Date Reaction Notes   NO KNOWN DRUG ALLERGIES --  --  --          Family Medical History  Disease Name Relative/Age Notes   Diabetes, unspecified type Grandmother (paternal)/   Grandmother (paternal)   Cardiovascular disease Grandfather (paternal)/  Grandmother (paternal)/   unknown age later in life 60's         Social History  Finding Status Start/Stop Quantity Notes   Alcohol Current some day 0/0 --  drinks rarely   Caffeine Current every day 0/0 --  drinks regularly; coffee; 3-4 times per day   Second hand smoke exposure Current some day 0/0 --  yes   Tobacco Current every day 16/0 1/2 ppd current everyday smoker; started smoking at age 15         Immunizations  NameDate Admin Mfg Trade Name Lot Number Route Inj VIS Given VIS Publication   Hepatitis A05/31/2018 SKB HAVRIX-ADULT  IM LA 05/31/2018 10/25/2011   Comments: Pt tolerated injection well with no adverse reaction.   [id number]/03/2020 NE Fluzone [employer]  NE NE 11/03/2020    Comments: [facility]         Review of Systems  · Constitutional  o Denies  o : fatigue, fever, weight gain, weight loss, chills  · HENT  o Admits  o : ear pain  o Denies  o : sore throat  · Cardiovascular  o Denies  o : chest Pain, palpitations, edema (swelling)  · Respiratory  o Denies  o : frequent cough, shortness of breath  · Gastrointestinal  o Denies  o : nausea, vomiting, changes in bowel habits  · Genitourinary  o Denies  o : dysuria, urinary frequency, urinary urgency, polyuria  · Neurologic  o Denies  o : headache, tingling or numbness, dizziness  · Psychiatric  o Admits  o : anxiety  o Denies  o : mood changes, memory changes, SI/HI  · Allergic-Immunologic  o Admits  o : seasonal allergies  o Denies  o : eczema, urticaria      Vitals  Date Time BP Position Site L\R Cuff Size HR RR TEMP (F) WT   "HT  BMI kg/m2 BSA m2 O2 Sat FR L/min FiO2 HC       02/04/2021 02:43 /58 Sitting    83 - R 18 97.9 179lbs 0oz 5'  4\" 30.72 1.91 100 %            Physical Examination  · Constitutional  o Appearance  o : well-nourished, in no acute distress  · Eyes  o Conjunctivae  o : conjunctivae normal  o Sclerae  o : sclerae white  o Pupils and Irises  o : pupils equal and round  o Eyelids/Ocular Adnexae  o : eyelid appearance normal, no exudates present  · Ears, Nose, Mouth and Throat  o Ears  o :   § External Ears  § : external auditory canal appearance within normal limits, no discharge present  § Otoscopic Examination  § : tympanic membrane appearance within normal limits bilaterally, cerumen not present  o Nose  o :   § External Nose  § : appearance normal  § Intranasal Exam  § : mucosa within normal limits, vestibules normal, no intranasal lesions present, septum midline, sinuses non tender to palpation  § Nasopharynx  § : no discharge present  o Oral Cavity  o :   § Oral Mucosa  § : oral mucosa normal  § Lips  § : lip appearance normal  § Teeth  § : normal dentation for age  o Throat  o :   § Oropharynx  § : no inflammation or lesions present, tonsils within normal limits  · Respiratory  o Respiratory Effort  o : breathing unlabored  o Inspection of Chest  o : normal appearance  o Auscultation of Lungs  o : normal breath sounds throughout inspiration and expiration  · Cardiovascular  o Heart  o :   § Auscultation of Heart  § : regular rate and rhythm, no murmurs, gallops or rubs  · Gastrointestinal  o Abdominal Examination  o : abdomen nontender to palpation, tone normal without rigidity or guarding, no masses present, bowel sounds present  · Skin and Subcutaneous Tissue  o General Inspection  o : no rashes or lesions present, no areas of discoloration  o Body Hair  o : hair normal for age, general body hair distribution normal for age  o Digits and Nails  o : no clubbing, cyanosis, deformities or edema present, " normal appearing nails  · Neurologic  o Mental Status Examination  o :   § Orientation  § : grossly oriented to person, place and time  o Gait and Station  o : normal gait, able to stand without difficulty  · Psychiatric  o Judgement and Insight  o : judgment and insight intact  o Mood and Affect  o : mood normal, affect appropriate     tenderness to palp on right TMJ region.           Assessment  · Allergic rhinitis due to allergen     477.9/J30.9  · Ear pain     388.70/H92.09  · TMJ (temporomandibular joint disorder)     524.60/M26.609      Plan  · Orders  o ACO-14: Influenza immunization administered or previously received [facility] () - - 02/04/2021  o ACO-39: Current medications updated and reviewed (, 1159F) - - 02/04/2021  · Medications  o cyclobenzaprine 10 mg oral tablet   SIG: take 1 tablet by oral route once a day (at bedtime) as needed for 30 days   DISP: (30) Tablet with 0 refills  Prescribed on 02/04/2021     o hydrochlorothiazide 25 mg oral tablet   SIG: take 1 tablet by oral route once a day (in the morning) for 90 days   DISP: (90) Tablet with 1 refills  Adjusted on 02/04/2021     o cetirizine 10 mg oral tablet   SIG: take 1 tablet (10 mg) by oral route once daily for 90 days   DISP: (90) Tablet with 1 refills  Refilled on 02/04/2021     o Singulair 10 mg oral tablet   SIG: take 1 tablet (10 mg) by oral route once daily at bedtime   DISP: (30) Tablet with 5 refills  Refilled on 02/04/2021     o Medications have been Reconciled  o Transition of Care or Provider Policy  · Instructions  o Patient was educated/instructed on their diagnosis, treatment and medications prior to discharge from the clinic today.  o Call the office with any concerns or questions.  · Disposition  o Keep follow up as scheduled.            Electronically Signed by: GEORGIE Ponce -Author on February 4, 2021 03:02:41 PM

## 2021-05-15 VITALS
HEIGHT: 64 IN | TEMPERATURE: 98.7 F | SYSTOLIC BLOOD PRESSURE: 110 MMHG | OXYGEN SATURATION: 98 % | HEART RATE: 55 BPM | WEIGHT: 180.5 LBS | DIASTOLIC BLOOD PRESSURE: 67 MMHG | RESPIRATION RATE: 18 BRPM | BODY MASS INDEX: 30.81 KG/M2

## 2021-05-15 VITALS
OXYGEN SATURATION: 98 % | WEIGHT: 179 LBS | BODY MASS INDEX: 30.56 KG/M2 | RESPIRATION RATE: 20 BRPM | SYSTOLIC BLOOD PRESSURE: 113 MMHG | HEART RATE: 78 BPM | HEIGHT: 64 IN | TEMPERATURE: 98.1 F | DIASTOLIC BLOOD PRESSURE: 65 MMHG

## 2021-05-16 VITALS
RESPIRATION RATE: 21 BRPM | DIASTOLIC BLOOD PRESSURE: 83 MMHG | HEIGHT: 64 IN | WEIGHT: 171 LBS | OXYGEN SATURATION: 100 % | BODY MASS INDEX: 29.19 KG/M2 | TEMPERATURE: 98.6 F | HEART RATE: 75 BPM | SYSTOLIC BLOOD PRESSURE: 121 MMHG

## 2021-05-16 VITALS
SYSTOLIC BLOOD PRESSURE: 117 MMHG | RESPIRATION RATE: 12 BRPM | DIASTOLIC BLOOD PRESSURE: 72 MMHG | OXYGEN SATURATION: 98 % | BODY MASS INDEX: 29.37 KG/M2 | HEART RATE: 81 BPM | WEIGHT: 172 LBS | HEIGHT: 64 IN | TEMPERATURE: 98.9 F

## 2021-05-28 VITALS
TEMPERATURE: 97.5 F | WEIGHT: 179.9 LBS | HEART RATE: 56 BPM | OXYGEN SATURATION: 99 % | OXYGEN SATURATION: 100 % | TEMPERATURE: 97.6 F | BODY MASS INDEX: 30.71 KG/M2 | DIASTOLIC BLOOD PRESSURE: 68 MMHG | SYSTOLIC BLOOD PRESSURE: 126 MMHG | DIASTOLIC BLOOD PRESSURE: 78 MMHG | BODY MASS INDEX: 30.56 KG/M2 | WEIGHT: 179.01 LBS | HEIGHT: 64 IN | HEART RATE: 61 BPM | SYSTOLIC BLOOD PRESSURE: 106 MMHG | HEIGHT: 64 IN | RESPIRATION RATE: 16 BRPM

## 2021-05-28 NOTE — PROGRESS NOTES
Patient: NNAMDI SOTO     Acct: [id number]     Report: #[id number]  UNIT #: [id number]     : 1975    Encounter Date:2019  PRIMARY CARE: RUPERT ESTRADA  ***Signed***  --------------------------------------------------------------------------------------------------------------------  NURSE INTAKE      Visit Type      Established Patient Visit            Chief Complaint      MELANOMA            Referring Provider/Copies To      Referring Provider:  RUPERT ESTRADA      Primary Care Provider:  RUPERT ESTRADA      Copies To:   RUPERT ESTRADA ;            History and Present Illness      Past Oncology Illness History      This is a very pleasant 41-year-old female with history of stage IIa melanoma     who presents to oncology clinic to establish care.  Since patient's melanoma     diagnosis, wide excision of her right calf she has had no systemic symptoms.      She follows up with Dr. Hitchcock for routine skin checks every 3 months.  No weight    loss, headaches, or new GI symptoms.  She is completely asymptomatic.  Of note     patient has a history of multinodular goiter.            1)  Invasive Superficial spreading Melanoma: dx: 2016. Re-excision to the     right calf leg. tumor size: outside biopsy did not give tumor dimensions. No     residual tumor in the wide excision specimen. No macroscopic satellite nodules     noted. No residual melanoma. Tumor thickness: 1.30 mm, Ulceration is present.     Margins uninvolved. SLN mapping and bx: No LN mets. Staging: Stage IIA.             Treatment:       Liver function: Elevated ALT 59, alk phos 107. normal AST 38. (19)      CT CAP: No evidence of metastatic disease. (2019)      Plan for MRI of liver. (19)            HPI - Oncology Interim      1)  Invasive Superficial spreading Melanoma: dx: 2016. Re-excision to the     right calf leg. tumor size: outside biopsy did not give tumor dimensions. No     residual tumor in the wide  excision specimen. No macroscopic satellite nodules     noted. No residual melanoma. Tumor thickness: 1.30 mm, Ulceration is present.     Margins uninvolved. SLN mapping and bx: No LN mets. Staging: Stage IIA.             Treatment:       Liver function: Elevated ALT 59, alk phos 107. normal AST 38. (7/30/19)      CT CAP: No evidence of metastatic disease. (8/7/2019)      Plan for MRI of liver. (8/14/19)            Presents for follow up for surveillance for melanoma. She reports she is doing     well. She has no great complaints at this time.  Reviewed labs. Reviewed CT scan    of the CAP. There is a sub cm nodule in the upper left breast. Recommend     mammography. This is scheduled for 8/30.  She does complain of asthenia.  She     describes it as a feeling of tiredness and fatigue.  It is 3 out of 10 in     severity.  No other modifying factors or associated signs or symptoms.            2) Elevated Transaminitis: Elevated ALT, normal AST. Denies any abdominal pain.            ECOG Performance Status      0            Most Recent Lab Findings      Laboratory Tests      7/31/19 08:02            PAST, FAMILY   Past Medical History      Past Medical History:  High Cholesterol      Other Hematology History:        MELANOMA            Past Surgical History      Melanoma Excision            Family History      Family History:  No Family History            Social History      Marital Status:        Lives independently:  Yes      Number of Children:  2            Tobacco Use      Tobacco status:  Current every day smoker      Smoking packs/day:  025      Quit status:  Considering quitting            Alcohol Use      Alcohol intake:  OCCASIONALLY            Substance Use      Substance use:  Denies use            REVIEW OF SYSTEMS      General:  Admits: Fatigue;          Denies: Appetite Change, Fever, Night Sweats, Weight Gain, Weight Loss      Eye:  Denies: Blurred Vision, Corrective Lenses, Diplopia, Eye  Irritation, Eye     Pain, Eye Redness, Spots in Vision, Vision Loss      ENT:  Denies: Headache, Hearing Loss, Hoarseness, Seizures, Sinus Congestion,     Sore Throat      Cardiovascular:  Denies: Chest Pain, Edema Ankles, Edema Legs, Irregular     Heartbeat, Palpitations      Respiratory:  Denies: Coughing Blood, Productive Cough, Shortness of Air,     Wheezing      Gastrointestinal:  Denies: Bloody Stools, Constipation, Diarrhea, Frequent     Heartburn, Nausea, Problem Swallowing, Tarry Stools, Unable to Control Bowels,     Vomiting      Genitourinary (female):  Denies: Blood in Urine, Decrease Urine Stream, Frequent    Urination, Incontinence, Painful Urination      Musculoskeletal:  Denies: Back Pain, Leg Cramps, Muscle Pain, Muscle Weakness,     Painful Joints, Swollen Joints      Integumentary:  Denies: Bleeds Easily, Bruises Easily, Hair Changes, Jaundice,     Lesions, Mole Changes, Nail Changes, Pigment Changes, Rash, Skin Discoloration      Neurologic:  Denies: Dizziness, Fainting, Numbness\Tingling, Paralysis, Seizures      Psychiatric:  Denies: Anxiety, Confused, Depression, Disoriented, Memory Loss      Endocrine:  Denies: Cold Intolerance, Diabetes, Excessive Sweating, Excessive     Thirst, Excessive Urination, Heat Intolerance, Flushing, Hyperthyroidism,     Hypothyroidism      Hematologic/Lymphatic:  Denies: Bruising, Bleeding, Enlarged Lymph Nodes,     Recurrent Infections, Transfusions      Reproductive:  Denies: Menopause, Heavy Periods, Pregnant, Still Menstruating            VITAL SIGNS AND SCORES      Vitals      Height 5 ft 4.17 in / 163 cm      Weight 179 lbs 0.216 oz / 81.2 kg      BSA 1.87 m2      BMI 30.6 kg/m2      Temperature 97.5 F / 36.39 C - Temporal      Pulse 56      Blood Pressure 106/68 Sitting, Left Arm      Pulse Oximetry 100%, RM AIR            Pain Score      Experiencing any pain?:  No      Pain Scale Used:  Numerical      Pain Intensity:  0            Fatigue Score       Experiencing any fatigue?:  Yes      Fatigue (0-10 scale):  3            EXAM      General Appearance:  Positive for: Alert, Oriented x3, Cooperative      Eye:  Positive for: Moist Conjunctiva, PERRLA, Reactive to Light      HEENT:  Positive for: Oropharynx clear;          Negative for: Erythema      Neck:  Positive for: Full ROM, Supple      Respiratory:  Positive for: CTAB, Normal Respiratory Effort      Abdomen/Gastro:  Positive for: Normal Active Bowel Sounds, Soft;          Negative for: Distention, Tenderness      Cardiovascular:  Positive for: RRR;          Negative for: Murmur, Peripheral Edema      Skin:  Positive for: Normal Temperature, Normal Texture and Turgor, Normal Tone      Psychiatric:  Positive for: AAO X 3, Appropriate Affect, Intact Judgement      Neurologic:  Positive for: Cranial Ner II-XII Intact, Deep Tendon Reflexes;          Negative for: Dizziness      Genitourinary:  Negative for: Bladder Distention      Musculoskeletal:  Positive for: Full ROM Lower Extremety, Full ROM Upper Extreme    ty, Full Muscle Strength, Full Muscle Tone      Lower Extremities:  Positive for: Normal Gait and Station, Pedal Pulses Intact,     Pedal Pulses Symetrical      Upper Extremities:  Negative for: Edema, Weakness      Lymphatic:  Negative for: Axillary, Cervical, Supraclavicular            PREVENTION      Hx Influenza Vaccination:  Yes      Date Influenza Vaccine Given:  Oct 1, 2018      Influenza Vaccine Declined:  No      2 or More Falls Past Year?:  No      Fall Past Year with Injury?:  No      Hx Pneumococcal Vaccination:  No      Encouraged to follow-up with:  PCP regarding preventative exams.      Chart initiated by      CANDIDA CARLSON [facility]            ALLERGY/MEDS      Allergies      Coded Allergies:             NO KNOWN ALLERGIES (Unverified , 8/14/19)            Medications      Last Reconciled on 8/14/19 16:53 by TRUMAN PARHAM      Loratadine (Alavert*) 10 Mg Tab.rapdis      10 MG PO BID,  #60 TAB.RAPDIS         Prov: TRUMAN PARHAM onc         8/14/19      Medications Reviewed:  Changes made to meds            IMPRESSION/PLAN      Impression      1)  Invasive Superficial spreading Melanoma: dx: 6/20/2016. Re-excision to the     right calf leg. tumor size: outside biopsy did not give tumor dimensions. No     residual tumor in the wide excision specimen. No macroscopic satellite nodules     noted. No residual melanoma. Tumor thickness: 1.30 mm, Ulceration is present.     Margins uninvolved. SLN mapping and bx: No LN mets. Staging: Stage IIA.             Treatment:       Liver function: Elevated ALT 59, alk phos 107. normal AST 38. (7/30/19)      CT CAP: No evidence of metastatic disease. (8/7/2019)      Plan for MRI of liver. (8/14/19)            Presents for follow up for surveillance for melanoma. She reports she is doing     well. She has no complaints at this time.  Reviewed labs. Reviewed CT scan of     the CAP. There is a sub cm nodule in the upper left breast. Recommend     mammography. This is scheduled for 8/30.             She was seen by Dr. Nunez today to discuss plan of care and NCCN guidelines.          2) Elevated Transaminitis: Elevated ALT, normal AST. Denies any abdominal pain.            Diagnosis      Melanoma         Malignant melanoma of right lower extremity including hip - C43.71         Melanoma location: lower extremity including hip         Laterality: right            Elevated liver enzymes - R74.8            Notes      New Medications      * LORATADINE (Alavert*) 10 MG TAB.RAPDIS: 10 MG PO BID #60      New Diagnostics      * Abdomen W/Cont MRI, As Soon As Possible         Dx: Melanoma - C43.9      * CBC With Auto Diff, 6 Months         Dx: Melanoma - C43.9      * CMP Comp Metabolic Panel, 6 Months         Dx: Melanoma - C43.9      * LDH, 6 Months         Dx: Melanoma - C43.9            Plan      1) Mammogram for left upper breast nodule ordered by PCP. Continue  close follow     up with Dr. Hitchcock dermatology for skin checks.             2) Plan for MRI of abdomen to evaluate for abnormal elevated liver function.     Will call with results. If MRI is abnormal will get her back sooner to review     scan and plan of care. Otherwise we will return in 6 month with repeat labs. She    has labs set up at PCP office to evaluate liver function. Labs in 6 months with     CBC, CMP, LDH.             ATTENDING PHYSICIAN: I personally saw and examined the patient. I reviewed with     NP, I performed key or critical portions of the E and M service required for     supervisory billing; I reviewed with her today the NCCN guidelines, which would     recommend surveillance including evaluation and blood work periodically.  If th    ere are any symptoms or abnormalities, directed evaluation is recommended.  At     this time, she has elevated liver enzymes, and as a consequence I would     recommend an MRI to definitively rule out liver metastasis as an explanation.      She will otherwise continue surveillance as is consistent with the NCCN     guidelines.  She indicates understanding and is amenable to this plan.            Patient Education      Patient Education Provided:  Yes            Topics Patient Counseled on      abdominal MRI            Alcohol Counseling      Counseling given:  None            GEORGIE      Asked for Input from, Dr. Nunez            Physician      Physician Input:  Seen pt. at request of, GEORGIE Hughes                 Disclaimer: Converted document may not contain table formatting or lab diagrams. Please see [employer] for the authenticated document.

## 2021-05-28 NOTE — PROGRESS NOTES
Patient: NNAMDI SOTO     Acct: [id number]     Report: #[id number]  UNIT #: [id number]     : 1975    Encounter Date:2020  PRIMARY CARE: RUPERT ESTRADA  ***Signed***  --------------------------------------------------------------------------------------------------------------------  NURSE INTAKE      Visit Type      Established Patient Visit            Chief Complaint      MELANOMA            Referring Provider/Copies To      Referring Provider:  RUPERT ESTRADA      Primary Care Provider:  RUPERT ESTRADA      Copies To:   RUPERT ESTRADA            History and Present Illness      Past Oncology Illness History      This is a very pleasant 41-year-old female with history of stage IIa melanoma     who presents to oncology clinic to establish care.  Since patient's melanoma     diagnosis, wide excision of her right calf she has had no systemic symptoms.      She follows up with Dr. Hitchcock for routine skin checks every 3 months.  No weight    loss, headaches, or new GI symptoms.  She is completely asymptomatic.  Of note     patient has a history of multinodular goiter.            1)  Invasive Superficial spreading Melanoma: dx: 2016. Re-excision to the     right calf leg. tumor size: outside biopsy did not give tumor dimensions. No     residual tumor in the wide excision specimen. No macroscopic satellite nodules     noted. No residual melanoma. Tumor thickness: 1.30 mm, Ulceration is present.     Margins uninvolved. SLN mapping and bx: No LN mets. Staging: Stage IIA.             Treatment:       Liver function: Elevated ALT 59, alk phos 107. normal AST 38. (19)      CT CAP: No evidence of metastatic disease. (2019)      Plan for MRI of liver. (19)            [facility] - Oncology Interim      Presents for follow up for invasive melanoma:             1)  Invasive Superficial spreading Melanoma: dx: 2016. Re-excision to the     right calf leg. tumor size: outside biopsy did not give  tumor dimensions. No     residual tumor in the wide excision specimen. No macroscopic satellite nodules     noted. No residual melanoma. Tumor thickness: 1.30 mm, Ulceration is present.     Margins uninvolved. SLN mapping and bx: No LN mets. Staging: Stage IIA.             Ms. Nnamdi Whipple presents for 6-month follow-up for invasive melanoma of the     right calf leg diagnosed in 2016.  She was last seen in 2019.  At     that time her AST was slightly elevated along with her alk phos was slightly     elevated.  An MRI of the liver was completed which did not show any metastatic     liver disease secondary to the melanoma.  On repeat labs dated 2020, her     liver function has returned to normal as well as the results of the CMP are     completely normal and CBC.  She reports she follows with Dr. Hitchcock and was     recently changed to every year follow-ups with her dermatology.  She reports her    skin checks have been have been normal.  She denies any new skin lesions assoc    iated with her previous surgical site of the melanoma.             In addition she denies any ongoing headaches, chronic cough or any bone or back     pain.             Treatment:       Liver function: Elevated ALT 59, alk phos 107. normal AST 38. (19).      CT CAP: No evidence of metastatic disease. (2019).      MRI of abdomen: 8 mm benign hemangioma.  No liver mets. (19).            ECOG Performance Status      0            Most Recent Lab Findings      Laboratory Tests      20 07:28            Most Recent Imaging Findings      [facility]                PACS RADIOLOGY REPORT            Patient: NNAMDI SOTO   Acct: #[id number]   Report: #[id number]            UNIT #: [id number]    DOS: 19       INSURANCE:[employer]   ORDER #:[id number]      LOCATION:[facility]     : 1975            PROVIDERS      ADMITTING:     ATTENDING:  TRUMAN PARHAM onc      FAMILY:  RUPERT ESTRADA   ORDERING:  TRUMAN PARHAM onc         OTHER:    DICTATING:  SARAI PORRAS MD            REQ #:[id number]   EXAM:ABDBM - MRI ABDOMEN w wo CONTRAST      REASON FOR EXAM:  MELANOMA, ABN LABS      REASON FOR VISIT:  MELANOMA,ABN LABS            *******Signed******         PROCEDURE:   MRI ABDOMEN WITHOUT AND WITH CONTRAST             COMPARISON:   None.             INDICATIONS:   ABNORMAL LFT'S. H/O MELANOMA, RIGHT LOWER LEG, 2016.             CONTRAST:   18ML  Multihance I.V.             TECHNIQUE:   A comprehensive MRI examination of the abdomen was performed     utilizing a variety of       imaging planes and imaging parameters to optimize visualization of suspected     pathology.  Images       were obtained both before and after intravenous gadolinium injection.               FINDINGS:         Abdomen without contrast:  Liver has normal size and morphology.  Subtle hepatic    fat deposition.        There are few sub cm cysts in the liver.             Spleen, kidneys, adrenal glands, pancreas, gallbladder, biliary system, and     included bowel loops       are unremarkable.             There are few cysts seen in the right breast measuring up to 9 mm.  Correlate     with mammography.             Abdomen with contrast:  8 mm benign hemangioma adjacent to the falciform     ligament.  No suspicious       enhancing liver mass.  Liver vessels are patent.  No abnormal enhancement seen     elsewhere in the       abdomen.               CONCLUSION:   Subtle hepatic steatosis.             No suspicious liver lesion.              SARAI PORRAS MD             Electronically Signed and Approved By: SARAI PORRAS MD on 8/20/2019 at 8:48                        PAST, FAMILY   Past Medical History      Past Medical History:  High Cholesterol      Other Hematology History:        MELANOMA            Past Surgical History      Melanoma Excision            Family History      Family  History:  No Family History            Social History      Marital Status:        Lives independently:  Yes      Number of Children:  2            Tobacco Use      Tobacco status:  Current every day smoker      Smoking packs/day:  025      Quit status:  Considering quitting            Alcohol Use      Alcohol intake:  OCCASIONALLY            Substance Use      Substance use:  Denies use            REVIEW OF SYSTEMS      General:  Denies: Appetite Change, Fatigue, Fever, Night Sweats, Weight Gain,     Weight Loss      Eye:  Denies Blurred Vision, Denies Corrective Lenses, Denies Diplopia, Denies     Vision Changes      ENT:  Denies Headache, Denies Hearing Loss, Denies Hoarseness, Denies Sore     Throat      Cardiovascular:  Denies Chest Pain, Denies Palpitations      Respiratory:  Denies: Coughing Blood, Productive Cough, Shortness of Air,     Wheezing      Gastrointestinal:  Denies Bloody Stools, Denies Constipation, Denies Diarrhea,     Denies Nausea/Vomiting, Denies Problem Swallowing, Denies Unable to Control     Bowels      Genitourinary:  Denies Blood in Urine, Denies Incontinence, Denies Painful     Urination      Musculoskeletal:  Denies Back Pain, Denies Muscle Pain, Denies Painful Joints      Integumentary:  Denies Itching, Denies Lesions, Denies Rash      Neurologic:  Denies Dizziness, Denies Numbness\Tingling, Denies Seizures      Psychiatric:  Denies Anxiety, Denies Depression      Endocrine:  Denies Cold Intolerance, Denies Heat Intolerance      Hematologic/Lymphatic:  Denies Bruising, Denies Bleeding, Denies Enlarged Lymph     Nodes      Reproductive:  Denies: Menopause, Heavy Periods, Pregnant, Still Menstruating            VITAL SIGNS AND SCORES      Vitals      Height 5 ft 4.17 in / 163 cm      Weight 179 lbs 14.326 oz / 81.6 kg      BSA 1.87 m2      BMI 30.7 kg/m2      Temperature 97.6 F / 36.44 C - Temporal      Pulse 61      Respirations 16      Blood Pressure 126/78 Sitting, Left Arm       Pulse Oximetry 99%, ROOM AIR            Pain Score      Experiencing any pain?:  No      Pain Scale Used:  Numerical      Pain Intensity:  0            Fatigue Score      Experiencing any fatigue?:  No      Fatigue (0-10 scale):  0 (none)            EXAM      General appearance:  in no apparent distress, cooperative, appears stated age.      HEENT: No pallor, no icterus, oral mucosa moist      Neck: Supple, trachea central-not deviated      Lymph nodes: none palpable peripherally      Cardiovascular: S1-S2 heard, no murmurs, no rubs, no gallops.      Breast exam:      Respiratory: Clear to auscultation bilaterally, no adventitious sounds      Abdomen/gastro: Soft, nontender, no palpable hepatosplenomegaly, bowel sounds     heard      Skin: No lesions, no rashes, no petechiae. No associated skin lesions to     previous melanoma site of the right calf leg.       Extremities: No pedal edema, peripheral pulses felt, no clubbing      Musculoskeletal: Normal tone, no rigidity of muscles; range of motion intact      Spine: No deformities      Psychiatric: Alert and oriented x3, appropriate affect, intact judgment      Neurologic: Cranial nerves II through XII grossly intact, no gross neurological     deficits noted.            PREVENTION      Hx Influenza Vaccination:  Yes      Date Influenza Vaccine Given:  Oct 1, 2019      Influenza Vaccine Declined:  No      2 or More Falls Past Year?:  No      Fall Past Year with Injury?:  No      Hx Pneumococcal Vaccination:  No      Encouraged to follow-up with:  PCP regarding preventative exams.      Chart initiated by      CHASE TURNER MA            ALLERGY/MEDS      Allergies      Coded Allergies:             NO KNOWN ALLERGIES (Unverified , 2/17/20)            Medications      Last Reconciled on 2/17/20 09:29 by TRUMAN PARHAM      Loratadine (Alavert*) 10 Mg Tab.rapdis      10 MG PO BID, #60 TAB.RAPDIS         Prov: TRUMAN PARHAM onc         8/14/19       Medications Reviewed:  No Changes made to meds            IMPRESSION/PLAN      Impression      1)  Invasive Superficial spreading Melanoma: dx: 6/20/2016. Re-excision to the     right calf leg. tumor size: outside biopsy did not give tumor dimensions. No     residual tumor in the wide excision specimen. No macroscopic satellite nodules     noted. No residual melanoma. Tumor thickness: 1.30 mm, Ulceration is present.     Margins uninvolved. SLN mapping and bx: No LN mets. Staging: Stage IIA.             Ms. Carol Whipple presents for 6-month follow-up for invasive melanoma of the     right calf leg diagnosed in June 2016.  She was last seen in August 2019.  At     that time her AST was slightly elevated along with her alk phos was slightly     elevated.  An MRI of the liver was completed which did not show any metastatic     liver disease secondary to the melanoma.  On repeat labs dated 2/14/2020, her     liver function has returned to normal as well as the results of the CMP are     completely normal and CBC.  She reports she follows with Dr. Hitchcock and was recen    tly changed to every year follow-ups with her dermatology.  She reports her skin    checks have been have been normal.  She denies any new skin lesions associated     with her previous surgical site of the melanoma.             In addition she denies any ongoing headaches, chronic cough or any bone or back     pain.             We discussed the NCCN guidelines for and stage II invasive melanoma.      Recommendation is to follow-up with her every 6 to 12 months for 5 years then     annually.  We will alternate visits with Dr. Hitchcock.  Her next appointment will     be in 1 year.  She will see Dr. Hitchcock in November 2020.             I instructed her to call for any onset of any worsening headaches, chronic cough    or ongoing bone or back pain.             She was given a copy of her abdominal MRI of from August 2019 and a copy of her     most recent labs.              Treatment:       Liver function: Elevated ALT 59, alk phos 107. normal AST 38. (7/30/19).      CT CAP: No evidence of metastatic disease. (8/7/2019).      MRI of abdomen: 8 mm benign hemangioma.  No liver mets. (8/14/19).            Plan      1) Follow up in 1 year with NP.             Follow up with Dr. Hitchcock, dermatology in November 2020.             Call for any questions or concerns.            Pain      Pain Zero Today            Advanced Care Plan Discussion      Declines Discussion [id number]            Patient Education            Melanoma      Regular Sunscreen Use May Reduce the Risk of Melanoma      Patient Education Provided:  Yes            Topics Patient Counseled on      Topics Pt. Counseled On:  Other (abdominal MRI, transaminitis. )            Electronically signed by TRUMAN PARHAM onc  02/17/2020 09:30       Disclaimer: Converted document may not contain table formatting or lab diagrams. Please see [employer] for the authenticated document.

## 2021-05-28 NOTE — PROGRESS NOTES
Patient: NNAMDI SOTO     Acct: [id number]     Report: #[id number]  UNIT #: [id number]     : 1975    Encounter Date:2021  PRIMARY CARE: RUPERT ESTRADA  ***Signed***  --------------------------------------------------------------------------------------------------------------------  TELEHEALTH NOTE      Past Oncology Illness History      This is a very pleasant 41-year-old female with history of stage IIa melanoma     who presents to oncology clinic to establish care.  Since patient's melanoma     diagnosis, wide excision of her right calf she has had no systemic symptoms.      She follows up with Dr. Hitchcock for routine skin checks every 3 months.  No weight    loss, headaches, or new GI symptoms.  She is completely asymptomatic.  Of note     patient has a history of multinodular goiter.            1)  Invasive Superficial spreading Melanoma: dx: 2016. Re-excision to the     right calf leg. tumor size: outside biopsy did not give tumor dimensions. No     residual tumor in the wide excision specimen. No macroscopic satellite nodules     noted. No residual melanoma. Tumor thickness: 1.30 mm, Ulceration is present.     Margins uninvolved. SLN mapping and bx: No LN mets. Staging: Stage IIA.             Treatment:       Liver function: Elevated ALT 59, alk phos 107. normal AST 38. (19)      CT CAP: No evidence of metastatic disease. (2019)      Plan for MRI of liver. (19)      Surveilance with dermatology yearly and oncology yearly. (21)            History of Present Illness            Chief Complaint: () MALA Soto is presenting for evaluation via Telehealth visit by phone.     Verbal consent obtained before beginning visit.            Provider spent (6) minutes with the patient during telehealth visit.            The following staff were present during the visit: ( Debi Frederick, GEORGIE)                         Overview of Symptoms            1)   Invasive Superficial spreading Melanoma: dx: 6/20/2016. Re-excision to the     right calf leg. tumor size: outside biopsy did not give tumor dimensions. No     residual tumor in the wide excision specimen. No macroscopic satellite nodules     noted. No residual melanoma. Tumor thickness: 1.30 mm, Ulceration is present.     Margins uninvolved. SLN mapping and bx: No LN mets. Staging: Stage IIA.             This is a telehealth visit with Ms. Giron for stage II A melanoma diagnosed in    June of 2016. She completed wide local excision of the right calf, SLN mapping     was negative. She reports she is doing well in the interim. She follows with Dr. Hitchcock, dermatology yearly now and sees us yearly. She last saw him in November     and she reports all was normal at this visit. She will be 5 years out from     diagnosis in June of this year.             She reports she is doing well. She denies any new skin lesions. Denies any skin     changes, lumps or masses to the right calf. Denies any frequent HA's, persistent    cough, abdominal pain or bone or back pain.             Labs were done at her last visit in 2/2020 and were normal.             We will order labs for her to have done in the next week.            Allergies/Medications      Allergies:        Coded Allergies:             NO KNOWN ALLERGIES (Unverified , 2/17/20)      Medications    Last Reconciled on 2/18/21 12:15 by TRUMAN PARHAM      Loratadine (Alavert*) 10 Mg Tab.rapdis      10 MG PO BID, #60 TAB.RAPDIS         Prov: TRUMAN PARHAM onc         8/14/19            Plan/Instructions      Ambulatory Assessment/Plan:        Melanoma of lower leg         Malignant melanoma of right lower leg - C43.71         Laterality: right            Notes      New Diagnostics      * CBC With Auto Diff, Week         Dx: Melanoma of lower leg - C43.70      * LDH, Week         Dx: Melanoma of lower leg - C43.70      * CMP Comp Metabolic Panel, Week         Dx:  Melanoma of lower leg - C43.70      Plan/Instructions      Labs in 1 week with CBC, CMP, LDH.             Will call with results after completed.             Skin checks PRN. Discussed importance of sunscreen.             Follow with dermatology as scheduled.             She was instructed to call for any concerning symptoms to our office or Dr. Hitchcock's office.             Follow up in 1 year in office with NP in 1 year or sooner if needed.             * Plan Of Care: ()            * Chronic conditions reviewed and taken into consideration for today's treatment       plan.      * Patient instructed to seek medical attention urgently for new or worsening       symptoms.      * Patient was educated/instructed on their diagnosis, treatment and medications       prior to discharge from the clinic today.      Codes:  Phone Eval 5-10 min 05555            Copies To:      RUPERT ESTRADA            Electronically signed by TRUMAN PARHAM onc  02/18/2021 12:15       Disclaimer: Converted document may not contain table formatting or lab diagrams. Please see [employer] for the authenticated document.

## 2021-06-25 ENCOUNTER — TRANSCRIBE ORDERS (OUTPATIENT)
Dept: ONCOLOGY | Facility: HOSPITAL | Age: 46
End: 2021-06-25

## 2021-06-25 ENCOUNTER — LAB (OUTPATIENT)
Dept: LAB | Facility: HOSPITAL | Age: 46
End: 2021-06-25

## 2021-06-25 DIAGNOSIS — C43.70 MALIGNANT MELANOMA OF LOWER EXTREMITY, INCLUDING HIP, UNSPECIFIED LATERALITY (HCC): ICD-10-CM

## 2021-06-25 DIAGNOSIS — C43.70 MALIGNANT MELANOMA OF LOWER EXTREMITY, INCLUDING HIP, UNSPECIFIED LATERALITY (HCC): Primary | ICD-10-CM

## 2021-06-25 LAB
ALBUMIN SERPL-MCNC: 4 G/DL (ref 3.5–5.2)
ALBUMIN/GLOB SERPL: 1.6 G/DL
ALP SERPL-CCNC: 100 U/L (ref 39–117)
ALT SERPL W P-5'-P-CCNC: 20 U/L (ref 1–33)
ANION GAP SERPL CALCULATED.3IONS-SCNC: 9.8 MMOL/L (ref 5–15)
AST SERPL-CCNC: 22 U/L (ref 1–32)
BASOPHILS # BLD AUTO: 0.03 10*3/MM3 (ref 0–0.2)
BASOPHILS NFR BLD AUTO: 0.4 % (ref 0–1.5)
BILIRUB SERPL-MCNC: 0.3 MG/DL (ref 0–1.2)
BUN SERPL-MCNC: 7 MG/DL (ref 6–20)
BUN/CREAT SERPL: 13.7 (ref 7–25)
CALCIUM SPEC-SCNC: 8.9 MG/DL (ref 8.6–10.5)
CHLORIDE SERPL-SCNC: 103 MMOL/L (ref 98–107)
CO2 SERPL-SCNC: 25.2 MMOL/L (ref 22–29)
CREAT SERPL-MCNC: 0.51 MG/DL (ref 0.57–1)
DEPRECATED RDW RBC AUTO: 45.2 FL (ref 37–54)
EOSINOPHIL # BLD AUTO: 0.23 10*3/MM3 (ref 0–0.4)
EOSINOPHIL NFR BLD AUTO: 2.9 % (ref 0.3–6.2)
ERYTHROCYTE [DISTWIDTH] IN BLOOD BY AUTOMATED COUNT: 13.3 % (ref 12.3–15.4)
GFR SERPL CREATININE-BSD FRML MDRD: 129 ML/MIN/1.73
GFR SERPL CREATININE-BSD FRML MDRD: >150 ML/MIN/1.73
GLOBULIN UR ELPH-MCNC: 2.5 GM/DL
GLUCOSE SERPL-MCNC: 86 MG/DL (ref 65–99)
HCT VFR BLD AUTO: 36.8 % (ref 34–46.6)
HGB BLD-MCNC: 12.2 G/DL (ref 12–15.9)
IMM GRANULOCYTES # BLD AUTO: 0.01 10*3/MM3 (ref 0–0.05)
IMM GRANULOCYTES NFR BLD AUTO: 0.1 % (ref 0–0.5)
LYMPHOCYTES # BLD AUTO: 3.32 10*3/MM3 (ref 0.7–3.1)
LYMPHOCYTES NFR BLD AUTO: 42.3 % (ref 19.6–45.3)
MCH RBC QN AUTO: 30.2 PG (ref 26.6–33)
MCHC RBC AUTO-ENTMCNC: 33.2 G/DL (ref 31.5–35.7)
MCV RBC AUTO: 91.1 FL (ref 79–97)
MONOCYTES # BLD AUTO: 0.45 10*3/MM3 (ref 0.1–0.9)
MONOCYTES NFR BLD AUTO: 5.7 % (ref 5–12)
NEUTROPHILS NFR BLD AUTO: 3.8 10*3/MM3 (ref 1.7–7)
NEUTROPHILS NFR BLD AUTO: 48.6 % (ref 42.7–76)
NRBC BLD AUTO-RTO: 0 /100 WBC (ref 0–0.2)
PLATELET # BLD AUTO: 375 10*3/MM3 (ref 140–450)
PMV BLD AUTO: 10.1 FL (ref 6–12)
POTASSIUM SERPL-SCNC: 3.8 MMOL/L (ref 3.5–5.2)
PROT SERPL-MCNC: 6.5 G/DL (ref 6–8.5)
RBC # BLD AUTO: 4.04 10*6/MM3 (ref 3.77–5.28)
SODIUM SERPL-SCNC: 138 MMOL/L (ref 136–145)
WBC # BLD AUTO: 7.84 10*3/MM3 (ref 3.4–10.8)

## 2021-06-25 PROCEDURE — 80053 COMPREHEN METABOLIC PANEL: CPT

## 2021-06-25 PROCEDURE — 85025 COMPLETE CBC W/AUTO DIFF WBC: CPT

## 2021-06-25 PROCEDURE — 36415 COLL VENOUS BLD VENIPUNCTURE: CPT

## 2021-07-09 ENCOUNTER — OFFICE VISIT (OUTPATIENT)
Dept: FAMILY MEDICINE CLINIC | Facility: CLINIC | Age: 46
End: 2021-07-09

## 2021-07-09 VITALS
OXYGEN SATURATION: 98 % | BODY MASS INDEX: 30.56 KG/M2 | SYSTOLIC BLOOD PRESSURE: 121 MMHG | RESPIRATION RATE: 18 BRPM | TEMPERATURE: 97.8 F | DIASTOLIC BLOOD PRESSURE: 71 MMHG | WEIGHT: 179 LBS | HEART RATE: 59 BPM | HEIGHT: 64 IN

## 2021-07-09 DIAGNOSIS — Z12.11 SCREEN FOR COLON CANCER: Primary | ICD-10-CM

## 2021-07-09 DIAGNOSIS — Z13.1 DIABETES MELLITUS SCREENING: ICD-10-CM

## 2021-07-09 DIAGNOSIS — N95.1 MENOPAUSAL FLUSHING: ICD-10-CM

## 2021-07-09 DIAGNOSIS — J30.9 CHRONIC ALLERGIC RHINITIS: ICD-10-CM

## 2021-07-09 DIAGNOSIS — Z13.220 LIPID SCREENING: ICD-10-CM

## 2021-07-09 DIAGNOSIS — Z13.29 SCREENING FOR THYROID DISORDER: ICD-10-CM

## 2021-07-09 DIAGNOSIS — Z12.11 COLON CANCER SCREENING: ICD-10-CM

## 2021-07-09 DIAGNOSIS — M79.641 PAIN OF RIGHT HAND: ICD-10-CM

## 2021-07-09 LAB
CHOLEST SERPL-MCNC: 217 MG/DL (ref 0–200)
HBA1C MFR BLD: 5.7 % (ref 4.8–5.6)
HDLC SERPL-MCNC: 35 MG/DL (ref 40–60)
LDLC SERPL CALC-MCNC: 137 MG/DL (ref 0–100)
LDLC/HDLC SERPL: 3.78 {RATIO}
TRIGL SERPL-MCNC: 249 MG/DL (ref 0–150)
TSH SERPL DL<=0.05 MIU/L-ACNC: 1.21 UIU/ML (ref 0.27–4.2)
VLDLC SERPL-MCNC: 45 MG/DL (ref 5–40)

## 2021-07-09 PROCEDURE — 99214 OFFICE O/P EST MOD 30 MIN: CPT | Performed by: NURSE PRACTITIONER

## 2021-07-09 PROCEDURE — 80061 LIPID PANEL: CPT | Performed by: NURSE PRACTITIONER

## 2021-07-09 PROCEDURE — 83036 HEMOGLOBIN GLYCOSYLATED A1C: CPT | Performed by: NURSE PRACTITIONER

## 2021-07-09 PROCEDURE — 84443 ASSAY THYROID STIM HORMONE: CPT | Performed by: NURSE PRACTITIONER

## 2021-07-09 PROCEDURE — 36415 COLL VENOUS BLD VENIPUNCTURE: CPT | Performed by: NURSE PRACTITIONER

## 2021-07-09 RX ORDER — MONTELUKAST SODIUM 10 MG/1
1 TABLET ORAL DAILY
COMMUNITY
Start: 2021-07-02 | End: 2021-07-09 | Stop reason: SDUPTHER

## 2021-07-09 RX ORDER — LORATADINE 10 MG/1
10 TABLET ORAL DAILY
Qty: 90 TABLET | Refills: 1 | Status: SHIPPED | OUTPATIENT
Start: 2021-07-09 | End: 2021-10-19 | Stop reason: SDUPTHER

## 2021-07-09 RX ORDER — CETIRIZINE HYDROCHLORIDE 10 MG/1
10 TABLET ORAL DAILY
COMMUNITY
End: 2021-10-19

## 2021-07-09 RX ORDER — CLONIDINE HYDROCHLORIDE 0.1 MG/1
0.1 TABLET ORAL
Qty: 30 TABLET | Refills: 2 | Status: SHIPPED | OUTPATIENT
Start: 2021-07-09 | End: 2021-08-13 | Stop reason: SDUPTHER

## 2021-07-09 RX ORDER — HYDROCHLOROTHIAZIDE 25 MG/1
25 TABLET ORAL DAILY
COMMUNITY
End: 2021-12-13 | Stop reason: SDUPTHER

## 2021-07-09 RX ORDER — FLUTICASONE PROPIONATE 50 MCG
2 SPRAY, SUSPENSION (ML) NASAL DAILY
Qty: 16 G | Refills: 5 | Status: SHIPPED | OUTPATIENT
Start: 2021-07-09

## 2021-07-09 RX ORDER — MONTELUKAST SODIUM 10 MG/1
10 TABLET ORAL DAILY
Qty: 90 TABLET | Refills: 3 | Status: SHIPPED | OUTPATIENT
Start: 2021-07-09 | End: 2022-07-28 | Stop reason: SDUPTHER

## 2021-07-09 RX ORDER — LORATADINE 10 MG/1
10 TABLET ORAL DAILY
COMMUNITY
End: 2021-07-09 | Stop reason: SDUPTHER

## 2021-07-09 NOTE — PATIENT INSTRUCTIONS
Menopause and Hormone Replacement Therapy  Menopause is a normal time of life when menstrual periods stop completely and the ovaries stop producing the female hormones estrogen and progesterone. This lack of hormones can affect your health and cause undesirable symptoms. Hormone replacement therapy (HRT) can relieve some of those symptoms.  What is hormone replacement therapy?  HRT is the use of artificial (synthetic) hormones to replace hormones that your body has stopped producing because you have reached menopause.  What are my options for HRT?    HRT may consist of the synthetic hormones estrogen and progestin, or it may consist of only estrogen (estrogen-only therapy). You and your health care provider will decide which form of HRT is best for you.  If you choose to be on HRT and you have a uterus, estrogen and progestin are usually prescribed. Estrogen-only therapy is used for women who do not have a uterus.  Possible options for taking HRT include:  · Pills.  · Patches.  · Gels.  · Sprays.  · Vaginal cream.  · Vaginal rings.  · Vaginal inserts.  The amount of hormone(s) that you take and how long you take the hormone(s) varies according to your health. It is important to:  · Begin HRT with the lowest possible dosage.  · Stop HRT as soon as your health care provider tells you to stop.  · Work with your health care provider so that you feel informed and comfortable with your decisions.  What are the benefits of HRT?  HRT can reduce the frequency and severity of menopausal symptoms. Benefits of HRT vary according to the kind of symptoms that you have, how severe they are, and your overall health. HRT may help to improve the following symptoms of menopause:  · Hot flashes and night sweats. These are sudden feelings of heat that spread over the face and body. The skin may turn red, like a blush. Night sweats are hot flashes that happen while you are sleeping or trying to sleep.  · Bone loss (osteoporosis). The  body loses calcium more quickly after menopause, causing the bones to become weaker. This can increase the risk for bone breaks (fractures).  · Vaginal dryness. The lining of the vagina can become thin and dry, which can cause pain during sex or cause infection, burning, or itching.  · Urinary tract infections.  · Urinary incontinence. This is the inability to control when you pass urine.  · Irritability.  · Short-term memory problems.  What are the risks of HRT?  Risks of HRT vary depending on your individual health and medical history. Risks of HRT also depend on whether you receive both estrogen and progestin or you receive estrogen only. HRT may increase the risk of:  · Spotting. This is when a small amount of blood leaks from the vagina unexpectedly.  · Endometrial cancer. This cancer is in the lining of the uterus (endometrium).  · Breast cancer.  · Increased density of breast tissue. This can make it harder to find breast cancer on a breast X-ray (mammogram).  · Stroke.  · Heart disease.  · Blood clots.  · Gallbladder disease.  · Liver disease.  Risks of HRT can increase if you have any of the following conditions:  · Endometrial cancer.  · Liver disease.  · Heart disease.  · Breast cancer.  · History of blood clots.  · History of stroke.  Follow these instructions at home:  · Take over-the-counter and prescription medicines only as told by your health care provider.  · Get mammograms, pelvic exams, and medical checkups as often as told by your health care provider.  · Have Pap tests done as often as told by your health care provider. A Pap test is sometimes called a Pap smear. It is a screening test that is used to check for signs of cancer of the cervix and vagina. A Pap test can also identify the presence of infection or precancerous changes. Pap tests may be done:  ? Every 3 years, starting at age 21.  ? Every 5 years, starting after age 30, in combination with testing for human papillomavirus  (HPV).  ? More often or less often depending on other medical conditions you have, your age, and other risk factors.  · It is up to you to get the results of your Pap test. Ask your health care provider, or the department that is doing the test, when your results will be ready.  · Keep all follow-up visits as told by your health care provider. This is important.  Contact a health care provider if you have:  · Pain or swelling in your legs.  · Shortness of breath.  · Chest pain.  · Lumps or changes in your breasts or armpits.  · Slurred speech.  · Pain, burning, or bleeding when you urinate.  · Unusual vaginal bleeding.  · Dizziness or headaches.  · Weakness or numbness in any part of your arms or legs.  · Pain in your abdomen.  Summary  · Menopause is a normal time of life when menstrual periods stop completely and the ovaries stop producing the female hormones estrogen and progesterone.  · Hormone replacement therapy (HRT) can relieve some of the symptoms of menopause.  · HRT can reduce the frequency and severity of menopausal symptoms.  · Risks of HRT vary depending on your individual health and medical history.  This information is not intended to replace advice given to you by your health care provider. Make sure you discuss any questions you have with your health care provider.  Document Revised: 08/20/2019 Document Reviewed: 08/20/2019  [employer] Patient Education © 2021 [employer]

## 2021-07-09 NOTE — PROGRESS NOTES
"Answers for HPI/ROS submitted by the patient on 7/9/2021  Please describe your symptoms.: Hot flashes during the night, lingering cough, hand pain, indigestion  Have you had these symptoms before?: No  How long have you been having these symptoms?: 5-7 days  What is the primary reason for your visit?: Other    Chief Complaint  Follow-up, Hyperlipidemia, Restless Legs Syndrome, Allergies, Hot Flashes (Waking up 4-5 times a night), Cough (dry cough, will get tickle in throat), Hand Pain (Right hand in middle of palm x 3 days. No injury), Heartburn (Will have chest pains, belching more), and Elbow Pain (Left elbow pain)      Flor Giron is a 46 y.o. female who presents to [facility]  History of Present Illness    AR - some flaring of eyes. Pt switched to loratadine.     Pain in chest left sided. C/o burping. Denies reflux.     Middle of right hand pain - denies any injury.    Recent left otitis - resolved.     C/o polydipsia. Family hx of diabetes - \"whole dads side\".    Hot flashes - pt is not getting much sleep. She is sweaty and hot during the night.    PHQ-2 Total Score: 0   PHQ-9 Total Score: 0       Review of Systems   Constitutional: Negative for chills, fatigue and fever.   Respiratory: Negative for cough and shortness of breath.    Cardiovascular: Negative for chest pain and palpitations.   Gastrointestinal: Negative for constipation, diarrhea, nausea and vomiting.   Endocrine:        Hot flashes   Musculoskeletal: Negative for back pain and neck pain.   Skin: Negative for rash.   Allergic/Immunologic: Positive for environmental allergies.   Neurological: Negative for dizziness and headaches.          Medical History: has a past medical history of Allergic (2015), Cancer (CMS/[facility]) (2016), Chronic allergic rhinitis, History of melanoma (07/13/2018), HLD (hyperlipidemia), IBS (irritable bowel syndrome), Limb pain, Limb swelling, and Multinodular goiter " (07/13/2018).     Surgical History: has a past surgical history that includes Knee arthroscopy (Right, 12/08/2006); Colonoscopy (12/08/2006); Cyst Removal; Tubal ligation; and Cosmetic surgery (2014).     Family History: family history includes Arthritis in her mother; Cancer in her maternal aunt; Diabetes in her paternal grandmother; Heart disease in her paternal grandfather and paternal grandmother; Thyroid disease in her maternal aunt.     Social History: reports that she has been smoking cigarettes. She has a 7.50 pack-year smoking history. She has never used smokeless tobacco. She reports previous alcohol use. She reports that she does not use drugs.    Allergies: Patient has no known allergies.      Health Maintenance Due   Topic Date Due   • ANNUAL PHYSICAL  Never done   • Pneumococcal Vaccine 0-64 (1 of 1 - PPSV23) Never done   • TDAP/TD VACCINES (1 - Tdap) Never done   • COLORECTAL CANCER SCREENING  12/08/2016   • HEPATITIS C SCREENING  Never done            Current Outpatient Medications:   •  Diclofenac Sodium (VOLTAREN) 1 % gel gel, Apply 2 g topically to the appropriate area as directed 4 (Four) Times a Day As Needed., Disp: , Rfl:   •  hydroCHLOROthiazide (HYDRODIURIL) 25 MG tablet, Take 25 mg by mouth Daily., Disp: , Rfl:   •  loratadine (Claritin) 10 MG tablet, Take 1 tablet by mouth Daily., Disp: 90 tablet, Rfl: 1  •  montelukast (SINGULAIR) 10 MG tablet, Take 1 tablet by mouth Daily., Disp: 90 tablet, Rfl: 3  •  cetirizine (ZyrTEC Allergy) 10 MG tablet, Take 10 mg by mouth Daily., Disp: , Rfl:   •  cloNIDine (Catapres) 0.1 MG tablet, Take 1 tablet by mouth every night at bedtime., Disp: 30 tablet, Rfl: 2  •  diclofenac (VOLTAREN) 50 MG EC tablet, Take 1 tablet by mouth 2 (Two) Times a Day As Needed (pain left trap)., Disp: 60 tablet, Rfl: 1  •  Diclofenac Sodium (VOLTAREN) 1 % gel gel, Apply 4 g topically to the appropriate area as directed 4 (Four) Times a Day As Needed (pain)., Disp: 100 g, Rfl:  "0  •  fluticasone (Flonase) 50 MCG/ACT nasal spray, 2 sprays into the nostril(s) as directed by provider Daily., Disp: 16 g, Rfl: 5      Immunization History   Administered Date(s) Administered   • COVID-19 (DAYDAY) 04/08/2021   • Hepatitis A 05/31/2018   • Influenza, Unspecified 11/03/2020         Objective       Vitals:    07/09/21 1118   BP: 121/71   Pulse: 59   Resp: 18   Temp: 97.8 °F (36.6 °C)   TempSrc: Temporal   SpO2: 98%   Weight: 81.2 kg (179 lb)   Height: 162.6 cm (64\")   PainSc:   4   PainLoc: Chest      Body mass index is 30.73 kg/m².   Wt Readings from Last 3 Encounters:   07/09/21 81.2 kg (179 lb)   02/04/21 81.2 kg (179 lb)   11/18/20 82.7 kg (182 lb 5 oz)      BP Readings from Last 3 Encounters:   07/09/21 121/71   02/04/21 126/58   07/27/20 110/67        Physical Exam  Vitals reviewed.   Constitutional:       Appearance: Normal appearance. She is well-developed.   HENT:      Head: Normocephalic and atraumatic.      Right Ear: Tympanic membrane normal. No middle ear effusion.      Left Ear: No tenderness.  No middle ear effusion. Tympanic membrane is injected.   Eyes:      Conjunctiva/sclera: Conjunctivae normal.      Pupils: Pupils are equal, round, and reactive to light.   Cardiovascular:      Rate and Rhythm: Normal rate and regular rhythm.      Heart sounds: Normal heart sounds. No murmur heard.     Pulmonary:      Effort: Pulmonary effort is normal.      Breath sounds: Normal breath sounds. No wheezing or rhonchi.   Abdominal:      General: Bowel sounds are normal. There is no distension.      Palpations: Abdomen is soft.      Tenderness: There is no abdominal tenderness.   Musculoskeletal:      Right hand: Tenderness present.      Left hand: No tenderness.      Comments: Full rom in right hand. Negative tinnels and phalens sign.    Skin:     General: Skin is warm and dry.   Neurological:      Mental Status: She is alert and oriented to person, place, and time.   Psychiatric:         Mood and " Affect: Mood and affect normal.         Behavior: Behavior normal.         Thought Content: Thought content normal.         Judgment: Judgment normal.             Result Review :     CMP    CMP 7/27/20 6/25/21   Glucose  86   Glucose 86    BUN 8 7   Creatinine 0.67 0.51 (A)   eGFR Non  Am  129   eGFR African Am  >150   Sodium 141 138   Potassium 3.9 3.8   Chloride 102 103   Calcium 9.6 8.9   Albumin 4.2 4.00   Total Bilirubin 0.34 0.3   Alkaline Phosphatase 103 (A) 100   AST (SGOT) 20 22   ALT (SGPT) 21 20   (A) Abnormal value            CBC    CBC 6/25/21   WBC 7.84   RBC 4.04   Hemoglobin 12.2   Hematocrit 36.8   MCV 91.1   MCH 30.2   MCHC 33.2   RDW 13.3   Platelets 375                      Assessment and Plan        Diagnoses and all orders for this visit:    1. Screen for colon cancer (Primary)  -     Cancel: Amb referral for Screening Colonoscopy  -     Amb referral for Screening Colonoscopy    2. Lipid screening  -     Lipid Panel    3. Screening for thyroid disorder  -     TSH    4. Diabetes mellitus screening  -     Hemoglobin A1c    5. Pain of right hand  -     XR Hand 3+ View Right; Future  -     diclofenac (VOLTAREN) 50 MG EC tablet; Take 1 tablet by mouth 2 (Two) Times a Day As Needed (pain left trap).  Dispense: 60 tablet; Refill: 1    6. Menopausal flushing  -     cloNIDine (Catapres) 0.1 MG tablet; Take 1 tablet by mouth every night at bedtime.  Dispense: 30 tablet; Refill: 2    7. Colon cancer screening    8. Chronic allergic rhinitis  -     fluticasone (Flonase) 50 MCG/ACT nasal spray; 2 sprays into the nostril(s) as directed by provider Daily.  Dispense: 16 g; Refill: 5  -     montelukast (SINGULAIR) 10 MG tablet; Take 1 tablet by mouth Daily.  Dispense: 90 tablet; Refill: 3  -     loratadine (Claritin) 10 MG tablet; Take 1 tablet by mouth Daily.  Dispense: 90 tablet; Refill: 1    Other orders  -     Diclofenac Sodium (VOLTAREN) 1 % gel gel; Apply 4 g topically to the appropriate area as  directed 4 (Four) Times a Day As Needed (pain).  Dispense: 100 g; Refill: 0          Follow Up     Return if symptoms worsen or fail to improve.    Patient was given instructions and counseling regarding her condition or for health maintenance advice. Please see specific information pulled into the AVS if appropriate.     GEORGIE Ponce

## 2021-07-12 ENCOUNTER — TELEPHONE (OUTPATIENT)
Dept: FAMILY MEDICINE CLINIC | Facility: CLINIC | Age: 46
End: 2021-07-12

## 2021-07-13 ENCOUNTER — HOSPITAL ENCOUNTER (OUTPATIENT)
Dept: GENERAL RADIOLOGY | Facility: HOSPITAL | Age: 46
Discharge: HOME | End: 2021-07-13
Admitting: NURSE PRACTITIONER

## 2021-07-13 DIAGNOSIS — M79.641 PAIN OF RIGHT HAND: ICD-10-CM

## 2021-07-13 PROCEDURE — 73130 X-RAY EXAM OF HAND: CPT

## 2021-07-15 ENCOUNTER — TELEPHONE (OUTPATIENT)
Dept: FAMILY MEDICINE CLINIC | Facility: CLINIC | Age: 46
End: 2021-07-15

## 2021-07-15 NOTE — TELEPHONE ENCOUNTER
Pt declines PT or Ortho at this time- pt states she looked up stretches to do at home and will call back if pain does not improve.

## 2021-07-22 ENCOUNTER — CLINICAL SUPPORT (OUTPATIENT)
Dept: GASTROENTEROLOGY | Facility: CLINIC | Age: 46
End: 2021-07-22

## 2021-07-22 ENCOUNTER — PREP FOR SURGERY (OUTPATIENT)
Dept: OTHER | Facility: HOSPITAL | Age: 46
End: 2021-07-22

## 2021-07-22 DIAGNOSIS — Z12.11 SCREEN FOR COLON CANCER: Primary | ICD-10-CM

## 2021-07-22 RX ORDER — SODIUM, POTASSIUM,MAG SULFATES 17.5-3.13G
2 SOLUTION, RECONSTITUTED, ORAL ORAL TAKE AS DIRECTED
Qty: 177 ML | Refills: 0 | Status: SHIPPED | OUTPATIENT
Start: 2021-07-22 | End: 2021-11-05 | Stop reason: HOSPADM

## 2021-07-30 DIAGNOSIS — D61.818 OTHER PANCYTOPENIA (HCC): Primary | ICD-10-CM

## 2021-07-30 DIAGNOSIS — C43.71 MALIGNANT MELANOMA OF RIGHT LOWER LEG (HCC): Primary | ICD-10-CM

## 2021-08-12 PROBLEM — E04.2 MULTINODULAR GOITER: Status: ACTIVE | Noted: 2018-07-13
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
## 2021-08-12 PROBLEM — K58.9 IBS (IRRITABLE BOWEL SYNDROME): Status: ACTIVE | Noted: 2021-08-12

## 2021-08-12 PROBLEM — Z85.820 HISTORY OF MELANOMA: Status: ACTIVE | Noted: 2018-07-13

## 2021-08-12 PROBLEM — E78.5 HYPERLIPIDEMIA: Status: ACTIVE | Noted: 2021-08-12

## 2021-08-12 PROBLEM — M79.89 LIMB SWELLING: Status: ACTIVE | Noted: 2021-08-12

## 2021-08-12 PROBLEM — M79.609 LIMB PAIN: Status: ACTIVE | Noted: 2021-08-12

## 2021-08-13 ENCOUNTER — OFFICE VISIT (OUTPATIENT)
Dept: FAMILY MEDICINE CLINIC | Facility: CLINIC | Age: 46
End: 2021-08-13

## 2021-08-13 VITALS
HEART RATE: 52 BPM | WEIGHT: 179.6 LBS | DIASTOLIC BLOOD PRESSURE: 80 MMHG | RESPIRATION RATE: 18 BRPM | SYSTOLIC BLOOD PRESSURE: 119 MMHG | OXYGEN SATURATION: 100 % | BODY MASS INDEX: 30.66 KG/M2 | HEIGHT: 64 IN | TEMPERATURE: 97.9 F

## 2021-08-13 DIAGNOSIS — F17.210 CIGARETTE NICOTINE DEPENDENCE WITHOUT COMPLICATION: ICD-10-CM

## 2021-08-13 DIAGNOSIS — N95.1 MENOPAUSAL FLUSHING: ICD-10-CM

## 2021-08-13 DIAGNOSIS — Z12.31 ENCOUNTER FOR SCREENING MAMMOGRAM FOR MALIGNANT NEOPLASM OF BREAST: Primary | ICD-10-CM

## 2021-08-13 PROCEDURE — 99214 OFFICE O/P EST MOD 30 MIN: CPT | Performed by: NURSE PRACTITIONER

## 2021-08-13 RX ORDER — CLONIDINE HYDROCHLORIDE 0.2 MG/1
0.2 TABLET ORAL
Qty: 30 TABLET | Refills: 2 | Status: SHIPPED | OUTPATIENT
Start: 2021-08-13 | End: 2022-01-27 | Stop reason: SDUPTHER

## 2021-08-13 RX ORDER — VARENICLINE TARTRATE 1 MG/1
1 TABLET, FILM COATED ORAL 2 TIMES DAILY
Qty: 56 TABLET | Refills: 1 | Status: SHIPPED | OUTPATIENT
Start: 2021-09-10 | End: 2021-11-05

## 2021-08-13 NOTE — PROGRESS NOTES
Chief Complaint  Follow-up (1 month follow up for Insomnia)    Flor Giron is a 46 y.o. female who presents to [facility]     History of Present Illness     Insomnia - trouble falling asleep. Pt reports she wakes up and she feels the heat radiating. Pt reports tolerating med well. Hot flashes were initially improved and she was only awakening once per night now she is up 3-4 times per night.     Nicotine dependence - pt is smoking 7 cigarette per day. Pt has tried chantix with success but she started back.     PHQ-2 Total Score:     PHQ-9 Total Score:         Review of Systems   Constitutional: Negative for chills, fatigue and fever.   Respiratory: Negative for cough and shortness of breath.    Cardiovascular: Negative for chest pain and palpitations.   Gastrointestinal: Negative for constipation, diarrhea, nausea and vomiting.   Endocrine:        Hot flashes   Musculoskeletal: Negative for back pain and neck pain.   Skin: Negative for rash.   Allergic/Immunologic: Positive for environmental allergies.   Neurological: Negative for dizziness and headaches.          Medical History: has a past medical history of Allergic (2015), Cancer (CMS/[facility]) (2016), Chronic allergic rhinitis, History of melanoma (07/13/2018), HLD (hyperlipidemia), IBS (irritable bowel syndrome), Limb pain, Limb swelling, and Multinodular goiter (07/13/2018).     Surgical History: has a past surgical history that includes Knee arthroscopy (Right, 12/08/2006); Colonoscopy (12/08/2006); Cyst Removal; Tubal ligation; and Cosmetic surgery (2014).     Family History: family history includes Arthritis in her mother; Cancer in her maternal aunt; Diabetes in her paternal grandmother; Heart disease in her paternal grandfather and paternal grandmother; Thyroid disease in her maternal aunt.     Social History: reports that she has been smoking cigarettes. She has a 7.50 pack-year smoking history. She  has never used smokeless tobacco. She reports previous alcohol use. She reports that she does not use drugs.    Allergies: Patient has no known allergies.      Health Maintenance Due   Topic Date Due   • ANNUAL PHYSICAL  Never done   • Pneumococcal Vaccine 0-64 (1 of 2 - PPSV23) Never done   • COLORECTAL CANCER SCREENING  12/08/2016   • HEPATITIS C SCREENING  Never done            Current Outpatient Medications:   •  cetirizine (ZyrTEC Allergy) 10 MG tablet, Take 10 mg by mouth Daily., Disp: , Rfl:   •  cloNIDine (Catapres) 0.2 MG tablet, Take 1 tablet by mouth every night at bedtime., Disp: 30 tablet, Rfl: 2  •  diclofenac (VOLTAREN) 50 MG EC tablet, Take 1 tablet by mouth 2 (Two) Times a Day As Needed (pain left trap)., Disp: 60 tablet, Rfl: 1  •  Diclofenac Sodium (VOLTAREN) 1 % gel gel, Apply 4 g topically to the appropriate area as directed 4 (Four) Times a Day As Needed (pain)., Disp: 100 g, Rfl: 0  •  fluticasone (Flonase) 50 MCG/ACT nasal spray, 2 sprays into the nostril(s) as directed by provider Daily., Disp: 16 g, Rfl: 5  •  hydroCHLOROthiazide (HYDRODIURIL) 25 MG tablet, Take 25 mg by mouth Daily., Disp: , Rfl:   •  loratadine (Claritin) 10 MG tablet, Take 1 tablet by mouth Daily., Disp: 90 tablet, Rfl: 1  •  montelukast (SINGULAIR) 10 MG tablet, Take 1 tablet by mouth Daily., Disp: 90 tablet, Rfl: 3  •  sodium-potassium-magnesium sulfates (Suprep Bowel Prep Kit) 17.5-3.13-1.6 GM/177ML solution oral solution, Take 2 bottles by mouth Take As Directed., Disp: 177 mL, Rfl: 0  •  Diclofenac Sodium (VOLTAREN) 1 % gel gel, Apply 2 g topically to the appropriate area as directed 4 (Four) Times a Day As Needed., Disp: , Rfl:   •  [START ON 9/10/2021] varenicline (Chantix Continuing Month Matti) 1 MG tablet, Take 1 tablet by mouth 2 (Two) Times a Day for 56 days., Disp: 56 tablet, Rfl: 1  •  varenicline (CHANTIX MATTI) 0.5 MG X 11 & 1 MG X 42 tablet, Take 0.5 mg po daily x 3 days, then 0.5 mg po bid x 4 days, then 1  "mg po bid, Disp: 53 tablet, Rfl: 0      Immunization History   Administered Date(s) Administered   • COVID-19 (DAYDAY) 04/08/2021   • Hepatitis A 05/31/2018   • Influenza, Unspecified 11/03/2020   • Tdap 07/21/2021         Objective       Vitals:    08/13/21 0959   BP: 119/80   Pulse: 52   Resp: 18   Temp: 97.9 °F (36.6 °C)   TempSrc: Temporal   SpO2: 100%   Weight: 81.5 kg (179 lb 9.6 oz)   Height: 162.6 cm (64\")   PainSc: 0-No pain      Body mass index is 30.83 kg/m².   Wt Readings from Last 3 Encounters:   08/13/21 81.5 kg (179 lb 9.6 oz)   07/09/21 81.2 kg (179 lb)   02/04/21 81.2 kg (179 lb)      BP Readings from Last 3 Encounters:   08/13/21 119/80   07/09/21 121/71   02/04/21 126/58        Physical Exam  Vitals reviewed.   Constitutional:       Appearance: Normal appearance. She is well-developed.   HENT:      Head: Normocephalic and atraumatic.   Eyes:      Conjunctiva/sclera: Conjunctivae normal.      Pupils: Pupils are equal, round, and reactive to light.   Cardiovascular:      Rate and Rhythm: Normal rate and regular rhythm.      Heart sounds: Normal heart sounds. No murmur heard.     Pulmonary:      Effort: Pulmonary effort is normal.      Breath sounds: Normal breath sounds. No wheezing or rhonchi.   Abdominal:      General: There is no distension.      Tenderness: There is no abdominal tenderness.   Skin:     General: Skin is warm and dry.   Neurological:      Mental Status: She is alert and oriented to person, place, and time.   Psychiatric:         Mood and Affect: Mood and affect normal.         Behavior: Behavior normal.         Thought Content: Thought content normal.         Judgment: Judgment normal.         Result Review :              Assessment and Plan        Diagnoses and all orders for this visit:    1. Encounter for screening mammogram for malignant neoplasm of breast (Primary)  -     Mammo Screening Bilateral With CAD; Future    2. Menopausal flushing  -     cloNIDine (Catapres) 0.2 MG " tablet; Take 1 tablet by mouth every night at bedtime.  Dispense: 30 tablet; Refill: 2    3. Cigarette nicotine dependence without complication  -     varenicline (CHANTIX MATTI) 0.5 MG X 11 & 1 MG X 42 tablet; Take 0.5 mg po daily x 3 days, then 0.5 mg po bid x 4 days, then 1 mg po bid  Dispense: 53 tablet; Refill: 0  -     varenicline (Chantix Continuing Month Matti) 1 MG tablet; Take 1 tablet by mouth 2 (Two) Times a Day for 56 days.  Dispense: 56 tablet; Refill: 1          Follow Up     Return in about 6 months (around 2/13/2022) for Next scheduled follow up.    Carol Giron  reports that she has been smoking cigarettes. She has a 7.50 pack-year smoking history. She has never used smokeless tobacco.. I have educated her on the risk of diseases from using tobacco products such as cancer, COPD and heart disease.     I advised her to quit and she is willing to quit. We have discussed the following method/s for tobacco cessation:  Counseling Prescription Medicaiton.  Together we have set a quit date for by October.  She will follow up with me in as needed.    I spent 3  minutes counseling the patient.       Patient was given instructions and counseling regarding her condition or for health maintenance advice. Please see specific information pulled into the AVS if appropriate.     GEORGIE Ponce

## 2021-08-13 NOTE — PATIENT INSTRUCTIONS
Managing Hot Flashes During Menopause  You will learn what hot flashes/night sweats are, what causes them and what the treatment methods are.  To view the content, go to this web address:  https://pe.[employer]/mbp35gl    This video will  on: 2023. If you need access to this video following this date, please reach out to the healthcare provider who assigned it to you.  This information is not intended to replace advice given to you by your health care provider. Make sure you discuss any questions you have with your health care provider.  [employer] Patient Education ©  Elsevier Inc.    Tobacco Use Disorder  Tobacco use disorder (TUD) occurs when a person craves, seeks, and uses tobacco, regardless of the consequences. This disorder can cause problems with mental and physical health. It can affect your ability to have healthy relationships, and it can keep you from meeting your responsibilities at work, home, or school.  Tobacco may be:  · Smoked as a cigarette or cigar.  · Inhaled using e-cigarettes.  · Smoked in a pipe or hookah.  · Chewed as smokeless tobacco.  · Inhaled into the nostrils as snuff.  Tobacco products contain a dangerous chemical called nicotine, which is very addictive. Nicotine triggers hormones that make the body feel stimulated and works on areas of the brain that make you feel good. These effects can make it hard for people to quit nicotine.  Tobacco contains many other unsafe chemicals that can damage almost every organ in the body. Smoking tobacco also puts others in danger due to fire risk and possible health problems caused by breathing in secondhand smoke.  What are the signs or symptoms?  Symptoms of TUD may include:  · Being unable to slow down or stop your tobacco use.  · Spending an abnormal amount of time getting or using tobacco.  · Craving tobacco. Cravings may last for up to 6 months after quitting.  · Tobacco use that:  ? Interferes with your work, school, or home  life.  ? Interferes with your personal and social relationships.  ? Makes you give up activities that you once enjoyed or found important.  · Using tobacco even though you know that it is:  ? Dangerous or bad for your health or someone else's health.  ? Causing problems in your life.  · Needing more and more of the substance to get the same effect (developing tolerance).  · Experiencing unpleasant symptoms if you do not use the substance (withdrawal). Withdrawal symptoms may include:  ? Depressed, anxious, or irritable mood.  ? Difficulty concentrating.  ? Increased appetite.  ? Restlessness or trouble sleeping.  · Using the substance to avoid withdrawal.  How is this diagnosed?  This condition may be diagnosed based on:  · Your current and past tobacco use. Your health care provider may ask questions about how your tobacco use affects your life.  · A physical exam.  You may be diagnosed with TUD if you have at least two symptoms within a 12-month period.  How is this treated?  This condition is treated by stopping tobacco use. Many people are unable to quit on their own and need help. Treatment may include:  · Nicotine replacement therapy (NRT). NRT provides nicotine without the other harmful chemicals in tobacco. NRT gradually lowers the dosage of nicotine in the body and reduces withdrawal symptoms. NRT is available as:  ? Over-the-counter gums, lozenges, and skin patches.  ? Prescription mouth inhalers and nasal sprays.  · Medicine that acts on the brain to reduce cravings and withdrawal symptoms.  · A type of talk therapy that examines your triggers for tobacco use, how to avoid them, and how to cope with cravings (behavioral therapy).  · Hypnosis. This may help with withdrawal symptoms.  · Joining a support group for others coping with TUD.  The best treatment for TUD is usually a combination of medicine, talk therapy, and support groups. Recovery can be a long process. Many people start using tobacco again  after stopping (relapse). If you relapse, it does not mean that treatment will not work.  Follow these instructions at home:    Lifestyle  · Do not use any products that contain nicotine or tobacco, such as cigarettes and e-cigarettes.  · Avoid things that trigger tobacco use as much as you can. Triggers include people and situations that usually cause you to use tobacco.  · Avoid drinks that contain caffeine, including coffee. These may worsen some withdrawal symptoms.  · Find ways to manage stress. Wanting to smoke may cause stress, and stress can make you want to smoke. Relaxation techniques such as deep breathing, meditation, and yoga may help.  · Attend support groups as needed. These groups are an important part of long-term recovery for many people.  General instructions  · Take over-the-counter and prescription medicines only as told by your health care provider.  · Check with your health care provider before taking any new prescription or over-the-counter medicines.  · Decide on a friend, family member, or smoking quit-line (such as 1-800-QUIT-NOW in the [place] that you can call or text when you feel the urge to smoke or when you need help coping with cravings.  · Keep all follow-up visits as told by your health care provider and therapist. This is important.  Contact a health care provider if:  · You are not able to take your medicines as prescribed.  · Your symptoms get worse, even with treatment.  Summary  · Tobacco use disorder (TUD) occurs when a person craves, seeks, and uses tobacco regardless of the consequences.  · This condition may be diagnosed based on your current and past tobacco use and a physical exam.  · Many people are unable to quit on their own and need help. Recovery can be a long process.  · The most effective treatment for TUD is usually a combination of medicine, talk therapy, and support groups.  This information is not intended to replace advice given to you by your health care  provider. Make sure you discuss any questions you have with your health care provider.  Document Revised: 12/05/2018 Document Reviewed: 12/05/2018  Elsevier Patient Education © 2021 Elsevier Inc.

## 2021-08-30 ENCOUNTER — TELEPHONE (OUTPATIENT)
Dept: GASTROENTEROLOGY | Facility: CLINIC | Age: 46
End: 2021-08-30

## 2021-08-30 NOTE — TELEPHONE ENCOUNTER
Spoke to Marcelle in surgery scheduling. Original scope date was 10/08/21. Provider no longer working that day. Called patient and moved scope to 11/05/21.

## 2021-10-07 ENCOUNTER — OFFICE VISIT (OUTPATIENT)
Dept: OBSTETRICS AND GYNECOLOGY | Facility: CLINIC | Age: 46
End: 2021-10-07

## 2021-10-07 VITALS
BODY MASS INDEX: 31.24 KG/M2 | SYSTOLIC BLOOD PRESSURE: 118 MMHG | DIASTOLIC BLOOD PRESSURE: 72 MMHG | HEIGHT: 64 IN | WEIGHT: 183 LBS | HEART RATE: 78 BPM

## 2021-10-07 DIAGNOSIS — Z01.419 WELL WOMAN EXAM: ICD-10-CM

## 2021-10-07 DIAGNOSIS — Z01.419 WOMEN'S ANNUAL ROUTINE GYNECOLOGICAL EXAMINATION: Primary | ICD-10-CM

## 2021-10-07 PROCEDURE — 88175 CYTOPATH C/V AUTO FLUID REDO: CPT | Performed by: OBSTETRICS & GYNECOLOGY

## 2021-10-07 PROCEDURE — 99396 PREV VISIT EST AGE 40-64: CPT | Performed by: OBSTETRICS & GYNECOLOGY

## 2021-10-07 NOTE — PROGRESS NOTES
Well Woman Visit    Chief Complaint   Patient presents with   • Gynecologic Exam     Yearly           HPI  Carol Giron is a 46 y.o. female,  who presents for annual well woman exam. Patient's last menstrual period was 2021..  Periods are irregular only 3 periods thus far this year. lasting.  Dysmenorrhea:none.  Patient reports problems with: none. There were no changes to her medical or surgical history since her last visit.. Partner Status: Marital Status: .  New Partners since last visit: no.  Desires STD Screening: no.    Additional OB/GYN History   Current contraception: contraceptive methods: None  Desires to: continue contraception  Last Pap :   Last Completed Pap Smear          Ordered - PAP SMEAR (Every 3 Years) Ordered on 10/7/2021    2019  Patient-Reported (Performed Externally) - Dr. Wall              Result: Normal  History of abnormal Pap smear: no  Last MMG :   Last Completed Mammogram     This patient has no relevant Health Maintenance data.         Last Colonoscopy :   Last Completed Colonoscopy     This patient has no relevant Health Maintenance data.        Hx [facility] intake? : Yes.  Qualified? : Yes    AllergiesPatient has no known allergies.   Family history of uterine, colon, breast, or ovarian cancer: no  Tobacco Usage?: No   OB History        2    Para   2    Term   2            AB        Living   2       SAB        TAB        Ectopic        Molar        Multiple        Live Births   2                The additional following portions of the patient's history were reviewed and updated as appropriate: allergies, current medications, past family history, past medical history, past social history, past surgical history and problem list.    Review of Systems   Constitutional: Negative.    HENT: Negative.    Eyes: Negative.    Respiratory: Negative.    Cardiovascular: Negative.    Gastrointestinal: Negative.    Endocrine: Negative.    Genitourinary:  "Negative.    Musculoskeletal: Negative.    Skin: Negative.    Allergic/Immunologic: Negative.    Neurological: Negative.    Hematological: Negative.    Psychiatric/Behavioral: Negative.        I have reviewed and agree with the HPI, ROS, and historical information as entered above. Karol Wall, DO    Objective   /72   Pulse 78   Ht 162.6 cm (64\")   Wt 83 kg (183 lb)   LMP 06/14/2021   Breastfeeding No   BMI 31.41 kg/m²     Physical Exam  Vitals reviewed. Exam conducted with a chaperone present.   HENT:      Head: Normocephalic.   Cardiovascular:      Rate and Rhythm: Normal rate and regular rhythm.      Heart sounds: Normal heart sounds.   Pulmonary:      Effort: Pulmonary effort is normal.      Breath sounds: Normal breath sounds.   Abdominal:      General: Bowel sounds are normal. There is no distension.      Palpations: Abdomen is soft. There is no mass.      Tenderness: There is no abdominal tenderness. There is no guarding or rebound.      Hernia: No hernia is present.   Genitourinary:     Rectum: Normal.      Comments: Nml female external genitalia.  Cervix is parous. Uterus axial normal size shape and consistency.  No adnexal masses are appreciated    Musculoskeletal:         General: Normal range of motion.      Cervical back: Normal range of motion.   Skin:     General: Skin is warm and dry.   Neurological:      General: No focal deficit present.      Mental Status: She is alert and oriented to person, place, and time.   Psychiatric:         Mood and Affect: Mood normal.         Behavior: Behavior normal.            Assessment and Plan    WWE    Diagnoses and all orders for this visit:    1. Women's annual routine gynecological examination (Primary)  -     IGP,rfx Aptima HPV All Pth    2. Well woman exam  -     Mammo Screening Digital Tomosynthesis Bilateral With CAD; Future        Counseling:  • Weight loss/Nutrition/Wt bearing exercise/Kegels/Core    Preventative:  • MMG  • Colonoscopy pt " has appointment for colonoscopy    Testing accepted. Testing negative but + AXIN2  And NTHL1 variants      She understands the importance of having the above performed in a timely fashion.  The risks of not performing them include, but are not limited to, advanced cancer stages, bone loss from osteoporosis and/or subsequent increase in morbidity and/or mortality.  She is encouraged to review her results online and/or contact or office if she has questions.     Follow Up:  Return for Annual physical 10/22.        Karol Wall DO  10/07/2021

## 2021-10-12 ENCOUNTER — TELEPHONE (OUTPATIENT)
Dept: FAMILY MEDICINE CLINIC | Facility: CLINIC | Age: 46
End: 2021-10-12

## 2021-10-12 LAB
CONV .: NORMAL
CYTOLOGIST CVX/VAG CYTO: NORMAL
CYTOLOGY CVX/VAG DOC CYTO: NORMAL
CYTOLOGY CVX/VAG DOC THIN PREP: NORMAL
DX ICD CODE: NORMAL
HIV 1 & 2 AB SER-IMP: NORMAL
OTHER STN SPEC: NORMAL
STAT OF ADQ CVX/VAG CYTO-IMP: NORMAL

## 2021-10-12 RX ORDER — CYCLOBENZAPRINE HCL 10 MG
10 TABLET ORAL 3 TIMES DAILY PRN
Qty: 30 TABLET | Refills: 0 | Status: SHIPPED | OUTPATIENT
Start: 2021-10-12 | End: 2022-07-28

## 2021-10-12 NOTE — TELEPHONE ENCOUNTER
Caller: Carol Giron    Relationship: Self    Best call back number: [phone]     What is the best time to reach you: ANYTIME     Who are you requesting to speak with (clinical staff, provider,  specific staff member): CLINICAL STAFF    What was the call regarding: PATIENT STATES THAT HER RIGHT EAR IS PAINFUL. THIS IS A REOCCURRING ISSUES, SHE IS WANTING TO KNOW IF SHE NEEDS TO COME IN TO HAVE IT LOOKED AT AGAIN OR IF SHE COULD JUST GET A MED REFILL IF DUE TO STRESS LIKE THE LAST TIME SHE WAS SEEN FOR SAME ISSUE.     Do you require a callback: YES PLEASE CALL BACK AND ADVISE

## 2021-10-13 NOTE — TELEPHONE ENCOUNTER
It is allergy season. It is difficult to make diagnosis of cause of ear pain without evaluation. Cyclobenzaprine rx was sent erx. If not improved, seek medical evaluation.

## 2021-10-14 ENCOUNTER — HOSPITAL ENCOUNTER (OUTPATIENT)
Dept: MAMMOGRAPHY | Facility: HOSPITAL | Age: 46
Discharge: HOME | End: 2021-10-14
Admitting: NURSE PRACTITIONER

## 2021-10-14 DIAGNOSIS — Z12.31 ENCOUNTER FOR SCREENING MAMMOGRAM FOR MALIGNANT NEOPLASM OF BREAST: ICD-10-CM

## 2021-10-14 PROCEDURE — 77063 BREAST TOMOSYNTHESIS BI: CPT

## 2021-10-14 PROCEDURE — 77067 SCR MAMMO BI INCL CAD: CPT

## 2021-10-18 DIAGNOSIS — J30.9 CHRONIC ALLERGIC RHINITIS: ICD-10-CM

## 2021-10-19 RX ORDER — CETIRIZINE HYDROCHLORIDE 10 MG/1
TABLET ORAL
Qty: 90 TABLET | Refills: 1 | Status: SHIPPED | OUTPATIENT
Start: 2021-10-19

## 2021-11-05 ENCOUNTER — HOSPITAL ENCOUNTER (OUTPATIENT)
Facility: HOSPITAL | Age: 46
Setting detail: HOSPITAL OUTPATIENT SURGERY
Discharge: HOME | End: 2021-11-05
Attending: INTERNAL MEDICINE | Admitting: INTERNAL MEDICINE

## 2021-11-05 ENCOUNTER — ANESTHESIA EVENT (OUTPATIENT)
Dept: GASTROENTEROLOGY | Facility: HOSPITAL | Age: 46
End: 2021-11-05

## 2021-11-05 ENCOUNTER — ANESTHESIA (OUTPATIENT)
Dept: GASTROENTEROLOGY | Facility: HOSPITAL | Age: 46
End: 2021-11-05

## 2021-11-05 VITALS
BODY MASS INDEX: 30.86 KG/M2 | OXYGEN SATURATION: 98 % | HEIGHT: 64 IN | RESPIRATION RATE: 16 BRPM | DIASTOLIC BLOOD PRESSURE: 80 MMHG | TEMPERATURE: 98.6 F | HEART RATE: 73 BPM | SYSTOLIC BLOOD PRESSURE: 115 MMHG | WEIGHT: 180.78 LBS

## 2021-11-05 PROCEDURE — 45378 DIAGNOSTIC COLONOSCOPY: CPT | Performed by: INTERNAL MEDICINE

## 2021-11-05 PROCEDURE — 25010000002 PROPOFOL 10 MG/ML EMULSION: Performed by: NURSE ANESTHETIST, CERTIFIED REGISTERED

## 2021-11-05 RX ORDER — SODIUM CHLORIDE, SODIUM LACTATE, POTASSIUM CHLORIDE, CALCIUM CHLORIDE 600; 310; 30; 20 MG/100ML; MG/100ML; MG/100ML; MG/100ML
30 INJECTION, SOLUTION INTRAVENOUS CONTINUOUS
Status: DISCONTINUED | OUTPATIENT
Start: 2021-11-05 | End: 2021-11-05 | Stop reason: HOSPADM

## 2021-11-05 RX ORDER — LIDOCAINE HYDROCHLORIDE 20 MG/ML
INJECTION, SOLUTION INFILTRATION; PERINEURAL AS NEEDED
Status: DISCONTINUED | OUTPATIENT
Start: 2021-11-05 | End: 2021-11-05 | Stop reason: SURG

## 2021-11-05 RX ADMIN — LIDOCAINE HYDROCHLORIDE 40 MG: 20 INJECTION, SOLUTION INFILTRATION; PERINEURAL at 14:29

## 2021-11-05 RX ADMIN — SODIUM CHLORIDE, POTASSIUM CHLORIDE, SODIUM LACTATE AND CALCIUM CHLORIDE: 600; 310; 30; 20 INJECTION, SOLUTION INTRAVENOUS at 14:27

## 2021-11-05 RX ADMIN — PROPOFOL 250 MCG/KG/MIN: 10 INJECTION, EMULSION INTRAVENOUS at 14:29

## 2021-11-05 NOTE — H&P
Pre Procedure History & Physical    Chief Complaint:   Screening colonoscopy    Subjective     HPI:   47 yo F here for screening colonoscopy.    Past Medical History:   Past Medical History:   Diagnosis Date   • Cancer (HCC) 2016    Melanoma   • Chronic allergic rhinitis    • History of melanoma 07/13/2018   • HLD (hyperlipidemia)    • IC (interstitial cystitis)    • Limb pain    • Limb swelling     right leg    • Multinodular goiter 07/13/2018       Past Surgical History:  Past Surgical History:   Procedure Laterality Date   • COLONOSCOPY  12/08/2006   • COSMETIC SURGERY  2014    Scar revision   • KNEE ARTHROSCOPY Right 12/08/2006    debridgement of meniscus tear of right knee   • TUBAL ABDOMINAL LIGATION         Family History:  Family History   Problem Relation Age of Onset   • Diabetes Paternal Grandmother         unspecified type   • Heart disease Paternal Grandmother    • Heart disease Paternal Grandfather         unknown age later in life 60s   • Arthritis Mother    • Thyroid cancer Maternal Aunt    • Malig Hyperthermia Neg Hx        Social History:   reports that she has been smoking cigarettes. She has a 3.75 pack-year smoking history. She has never used smokeless tobacco. She reports current alcohol use. She reports that she does not use drugs.    Medications:   Medications Prior to Admission   Medication Sig Dispense Refill Last Dose   • cetirizine (zyrTEC) 10 MG tablet TAKE ONE TABLET BY MOUTH DAILY 90 tablet 1    • cloNIDine (Catapres) 0.2 MG tablet Take 1 tablet by mouth every night at bedtime. 30 tablet 2    • cyclobenzaprine (FLEXERIL) 10 MG tablet Take 1 tablet by mouth 3 (Three) Times a Day As Needed for Muscle Spasms. 30 tablet 0    • Diclofenac Sodium (VOLTAREN) 1 % gel gel Apply 4 g topically to the appropriate area as directed 4 (Four) Times a Day As Needed (pain). 100 g 0    • fluticasone (Flonase) 50 MCG/ACT nasal spray 2 sprays into the nostril(s) as directed by provider Daily. 16 g 5    •  hydroCHLOROthiazide (HYDRODIURIL) 25 MG tablet Take 25 mg by mouth Daily.      • montelukast (SINGULAIR) 10 MG tablet Take 1 tablet by mouth Daily. 90 tablet 3    • Diclofenac Sodium (VOLTAREN) 1 % gel gel Apply 2 g topically to the appropriate area as directed 4 (Four) Times a Day As Needed.      • sodium-potassium-magnesium sulfates (Suprep Bowel Prep Kit) 17.5-3.13-1.6 GM/177ML solution oral solution Take 2 bottles by mouth Take As Directed. 177 mL 0    • varenicline (Chantix Continuing Month Ramu) 1 MG tablet Take 1 tablet by mouth 2 (Two) Times a Day for 56 days. 56 tablet 1        Allergies:  Patient has no known allergies.    ROS:    Pertinent items are noted in HPI     Objective     Blood pressure 127/73, pulse 60, temperature 97.2 °F (36.2 °C), temperature source Axillary, resp. rate 18, weight 82 kg (180 lb 12.4 oz), SpO2 97 %, not currently breastfeeding.    Physical Exam   Constitutional: Pt is oriented to person, place, and time and well-developed, well-nourished, and in no distress.   Mouth/Throat: Oropharynx is clear and moist.   Neck: Normal range of motion.   Cardiovascular: Normal rate, regular rhythm and normal heart sounds.    Pulmonary/Chest: Effort normal and breath sounds normal.   Abdominal: Soft. Nontender  Skin: Skin is warm and dry.   Psychiatric: Mood, memory, affect and judgment normal.     Assessment/Plan     Diagnosis:  Screening colonoscopy    Anticipated Surgical Procedure:  Colonoscopy    The risks, benefits, and alternatives of this procedure have been discussed with the patient or the responsible party- the patient understands and agrees to proceed.

## 2021-11-05 NOTE — ANESTHESIA PREPROCEDURE EVALUATION
Anesthesia Evaluation     Patient summary reviewed and Nursing notes reviewed   no history of anesthetic complications:  NPO Solid Status: > 8 hours  NPO Liquid Status: > 2 hours           Airway   Mallampati: I  TM distance: >3 FB  Neck ROM: full  No difficulty expected  Dental - normal exam     Pulmonary - negative pulmonary ROS and normal exam    breath sounds clear to auscultation  Cardiovascular - normal exam  Exercise tolerance: good (4-7 METS)    Rhythm: regular    (+) hyperlipidemia,       Neuro/Psych- negative ROS  GI/Hepatic/Renal/Endo - negative ROS     Musculoskeletal (-) negative ROS    Abdominal  - normal exam    Bowel sounds: normal.   Substance History - negative use     OB/GYN negative ob/gyn ROS         Other      history of cancer                    Anesthesia Plan    ASA 2     general   (Total IV Anesthesia    Patient understands anesthesia not responsible for dental damage.  )  intravenous induction     Anesthetic plan, all risks, benefits, and alternatives have been provided, discussed and informed consent has been obtained with: patient.

## 2021-11-05 NOTE — ANESTHESIA POSTPROCEDURE EVALUATION
Patient: Carol Giron    Procedure Summary     Date: 11/05/21 Room / Location: [facility] ENDOSCOPY 1 / [facility] ENDOSCOPY    Anesthesia Start: 1427 Anesthesia Stop: 1454    Procedure: COLONOSCOPY (N/A ) Diagnosis:       Screen for colon cancer      (Screen for colon cancer [Z12.11])    Surgeons: Francisca Olvera MD Provider: Juanjose Lynne MD    Anesthesia Type: general ASA Status: 2          Anesthesia Type: general    Vitals  Vitals Value Taken Time   /80 11/05/21 1517   Temp 37 °C (98.6 °F) 11/05/21 1516   Pulse 60 11/05/21 1518   Resp 16 11/05/21 1516   SpO2 98 % 11/05/21 1517   Vitals shown include unvalidated device data.        Post Anesthesia Care and Evaluation    Patient location during evaluation: bedside  Patient participation: complete - patient participated  Level of consciousness: awake  Pain score: 0  Pain management: adequate  Airway patency: patent  Anesthetic complications: No anesthetic complications  PONV Status: none  Cardiovascular status: acceptable and stable  Respiratory status: acceptable and room air  Hydration status: acceptable    Comments: An Anesthesiologist personally participated in the most demanding procedures (including induction and emergence if applicable) in the anesthesia plan, monitored the course of anesthesia administration at frequent intervals and remained physically present and available for immediate diagnosis and treatment of emergencies.

## 2021-12-02 ENCOUNTER — OFFICE VISIT (OUTPATIENT)
Dept: FAMILY MEDICINE CLINIC | Facility: CLINIC | Age: 46
End: 2021-12-02

## 2021-12-02 VITALS
HEIGHT: 64 IN | OXYGEN SATURATION: 99 % | HEART RATE: 71 BPM | TEMPERATURE: 98 F | SYSTOLIC BLOOD PRESSURE: 112 MMHG | DIASTOLIC BLOOD PRESSURE: 66 MMHG | BODY MASS INDEX: 31.76 KG/M2 | WEIGHT: 186 LBS

## 2021-12-02 DIAGNOSIS — J06.9 UPPER RESPIRATORY TRACT INFECTION, UNSPECIFIED TYPE: ICD-10-CM

## 2021-12-02 DIAGNOSIS — T78.40XS ALLERGIC DISORDER, SEQUELA: Primary | ICD-10-CM

## 2021-12-02 DIAGNOSIS — M25.512 ACUTE PAIN OF LEFT SHOULDER: ICD-10-CM

## 2021-12-02 DIAGNOSIS — S43.422A SPRAIN OF LEFT ROTATOR CUFF CAPSULE, INITIAL ENCOUNTER: ICD-10-CM

## 2021-12-02 DIAGNOSIS — K21.9 GASTROESOPHAGEAL REFLUX DISEASE WITHOUT ESOPHAGITIS: ICD-10-CM

## 2021-12-02 DIAGNOSIS — E66.09 CLASS 1 OBESITY DUE TO EXCESS CALORIES WITHOUT SERIOUS COMORBIDITY WITH BODY MASS INDEX (BMI) OF 31.0 TO 31.9 IN ADULT: ICD-10-CM

## 2021-12-02 DIAGNOSIS — H92.02 OTALGIA OF LEFT EAR: ICD-10-CM

## 2021-12-02 DIAGNOSIS — Z23 NEED FOR COVID-19 VACCINE: ICD-10-CM

## 2021-12-02 LAB
FSH SERPL-ACNC: 28.7 MIU/ML
LH SERPL-ACNC: 32.8 MIU/ML
PROGEST SERPL-MCNC: 0.26 NG/ML

## 2021-12-02 PROCEDURE — 82672 ASSAY OF ESTROGEN: CPT | Performed by: NURSE PRACTITIONER

## 2021-12-02 PROCEDURE — 99213 OFFICE O/P EST LOW 20 MIN: CPT | Performed by: NURSE PRACTITIONER

## 2021-12-02 PROCEDURE — 91300 COVID-19 (PFIZER): CPT | Performed by: NURSE PRACTITIONER

## 2021-12-02 PROCEDURE — 84144 ASSAY OF PROGESTERONE: CPT | Performed by: NURSE PRACTITIONER

## 2021-12-02 PROCEDURE — 83001 ASSAY OF GONADOTROPIN (FSH): CPT | Performed by: NURSE PRACTITIONER

## 2021-12-02 PROCEDURE — 0001A COVID-19 (PFIZER): CPT | Performed by: NURSE PRACTITIONER

## 2021-12-02 PROCEDURE — 83002 ASSAY OF GONADOTROPIN (LH): CPT | Performed by: NURSE PRACTITIONER

## 2021-12-02 RX ORDER — SEMAGLUTIDE 0.25 MG/.5ML
0.25 INJECTION, SOLUTION SUBCUTANEOUS WEEKLY
Qty: 0.5 ML | Refills: 0 | Status: SHIPPED | OUTPATIENT
Start: 2021-12-02 | End: 2021-12-03 | Stop reason: CLARIF

## 2021-12-02 RX ORDER — METHYLPREDNISOLONE 4 MG/1
TABLET ORAL
Qty: 1 EACH | Refills: 0 | Status: SHIPPED | OUTPATIENT
Start: 2021-12-02 | End: 2022-02-16 | Stop reason: SDUPTHER

## 2021-12-02 RX ORDER — OMEPRAZOLE 20 MG/1
20 CAPSULE, DELAYED RELEASE ORAL DAILY
Qty: 90 CAPSULE | Refills: 1 | Status: SHIPPED | OUTPATIENT
Start: 2021-12-02 | End: 2022-07-28

## 2021-12-02 RX ORDER — AMOXICILLIN 875 MG/1
875 TABLET, COATED ORAL 2 TIMES DAILY
Qty: 20 TABLET | Refills: 0 | Status: SHIPPED | OUTPATIENT
Start: 2021-12-02 | End: 2021-12-12

## 2021-12-02 NOTE — PROGRESS NOTES
Chief Complaint  Shoulder Pain (left x couple weeks)    Subjective          Carol Giron is a 46 y.o. female who presents to [facility]    History of Present Illness    Left otalgia - thick nasal for a few days. Mostly clear.     Left shoulder pain 2 weeks ago. Pt has trouble with driving and lying in bed. Limited ROM with posterior rotation. Intermittent.    Weight gain -patient reports no dietary changes.  Patient has granola bar for breakfast and a sandwich and chips for lunch with a normal dinner.  Patient denies any exercise regimen at present.  Patient reports that she mostly drinks water.  Patient has not had a menses since June.    GERD - feels reflux 2 times per week.  Patient reports burning or acid in the back of her throat.      PHQ-2 Total Score:     PHQ-9 Total Score:         Review of Systems   Constitutional: Positive for unexpected weight change. Negative for chills, fatigue and fever.   HENT: Positive for ear pain.    Respiratory: Negative for cough and shortness of breath.    Cardiovascular: Negative for chest pain and palpitations.   Gastrointestinal: Negative for constipation, diarrhea, nausea and vomiting.        Reflux   Musculoskeletal: Positive for arthralgias (left shoulder pain ). Negative for back pain and neck pain.   Skin: Negative for rash.   Neurological: Negative for dizziness and headaches.          Medical History: has a past medical history of Cancer (HCC) (2016), Chronic allergic rhinitis, History of melanoma (07/13/2018), HLD (hyperlipidemia), IC (interstitial cystitis), Limb pain, Limb swelling, and Multinodular goiter (07/13/2018).     Surgical History: has a past surgical history that includes Knee arthroscopy (Right, 12/08/2006); Colonoscopy (12/08/2006); Tubal ligation; Cosmetic surgery (2014); and Colonoscopy (N/A, 11/5/2021).     Family History: family history includes Arthritis in her mother; Diabetes in her paternal grandmother;  Heart disease in her paternal grandfather and paternal grandmother; Thyroid cancer in her maternal aunt.     Social History: reports that she has been smoking cigarettes. She has a 3.75 pack-year smoking history. She has never used smokeless tobacco. She reports current alcohol use. She reports that she does not use drugs.    Allergies: Patient has no known allergies.      Health Maintenance Due   Topic Date Due   • ANNUAL PHYSICAL  Never done   • Pneumococcal Vaccine 0-64 (1 of 2 - PPSV23) Never done   • HEPATITIS C SCREENING  Never done            Current Outpatient Medications:   •  cetirizine (zyrTEC) 10 MG tablet, TAKE ONE TABLET BY MOUTH DAILY, Disp: 90 tablet, Rfl: 1  •  cloNIDine (Catapres) 0.2 MG tablet, Take 1 tablet by mouth every night at bedtime., Disp: 30 tablet, Rfl: 2  •  cyclobenzaprine (FLEXERIL) 10 MG tablet, Take 1 tablet by mouth 3 (Three) Times a Day As Needed for Muscle Spasms., Disp: 30 tablet, Rfl: 0  •  fluticasone (Flonase) 50 MCG/ACT nasal spray, 2 sprays into the nostril(s) as directed by provider Daily., Disp: 16 g, Rfl: 5  •  hydroCHLOROthiazide (HYDRODIURIL) 25 MG tablet, Take 25 mg by mouth Daily., Disp: , Rfl:   •  montelukast (SINGULAIR) 10 MG tablet, Take 1 tablet by mouth Daily., Disp: 90 tablet, Rfl: 3  •  amoxicillin (AMOXIL) 875 MG tablet, Take 1 tablet by mouth 2 (Two) Times a Day for 10 days., Disp: 20 tablet, Rfl: 0  •  diclofenac (VOLTAREN) 50 MG EC tablet, Take 1 tablet by mouth 2 (Two) Times a Day As Needed (pain)., Disp: 60 tablet, Rfl: 2  •  Diclofenac Sodium (VOLTAREN) 1 % gel gel, Apply 4 g topically to the appropriate area as directed 4 (Four) Times a Day As Needed (pain)., Disp: 100 g, Rfl: 0  •  methylPREDNISolone (MEDROL) 4 MG dose pack, Take as directed on package instructions., Disp: 1 each, Rfl: 0  •  omeprazole (PrilOSEC) 20 MG capsule, Take 1 capsule by mouth Daily., Disp: 90 capsule, Rfl: 1  •  Semaglutide-Weight Management (semaglutide) 0.25 MG/0.5ML  "solution auto-injector, Inject 0.25 mg under the skin into the appropriate area as directed 1 (One) Time Per Week., Disp: 0.5 mL, Rfl: 0      Immunization History   Administered Date(s) Administered   • COVID-19 (DAYDAY) 04/08/2021   • COVID-19 (PFIZER) 12/02/2021   • Hepatitis A 05/31/2018   • Influenza, Unspecified 11/03/2020, 10/21/2021   • Tdap 07/21/2021         Objective       Vitals:    12/02/21 1411   BP: 112/66   BP Location: Left arm   Patient Position: Sitting   Cuff Size: Adult   Pulse: 71   Temp: 98 °F (36.7 °C)   TempSrc: Temporal   SpO2: 99%   Weight: 84.4 kg (186 lb)   Height: 162.6 cm (64.02\")      Body mass index is 31.91 kg/m².   Wt Readings from Last 3 Encounters:   12/02/21 84.4 kg (186 lb)   11/05/21 82 kg (180 lb 12.4 oz)   10/07/21 83 kg (183 lb)      BP Readings from Last 3 Encounters:   12/02/21 112/66   11/05/21 115/80   10/07/21 118/72        Physical Exam  Vitals reviewed.   Constitutional:       Appearance: Normal appearance. She is well-developed.   HENT:      Head: Normocephalic and atraumatic.      Right Ear: No middle ear effusion. There is no impacted cerumen. Tympanic membrane is not injected.      Left Ear:  No middle ear effusion. There is no impacted cerumen. Tympanic membrane is injected.   Eyes:      Conjunctiva/sclera: Conjunctivae normal.      Pupils: Pupils are equal, round, and reactive to light.   Cardiovascular:      Rate and Rhythm: Normal rate and regular rhythm.      Heart sounds: Normal heart sounds. No murmur heard.      Pulmonary:      Effort: Pulmonary effort is normal.      Breath sounds: Normal breath sounds. No wheezing or rhonchi.   Abdominal:      General: Bowel sounds are normal. There is no distension.      Palpations: Abdomen is soft.      Tenderness: There is no abdominal tenderness.   Musculoskeletal:      Comments: No limitation with upward extension. Pain noted with posterior rotation.    Skin:     General: Skin is warm and dry.   Neurological:      " Mental Status: She is alert and oriented to person, place, and time.   Psychiatric:         Mood and Affect: Mood and affect normal.         Behavior: Behavior normal.         Thought Content: Thought content normal.         Judgment: Judgment normal.             Result Review :               Assessment and Plan        Diagnoses and all orders for this visit:    1. Allergic disorder, sequela (Primary)    2. Otalgia of left ear  -     methylPREDNISolone (MEDROL) 4 MG dose pack; Take as directed on package instructions.  Dispense: 1 each; Refill: 0    3. Acute pain of left shoulder  -     methylPREDNISolone (MEDROL) 4 MG dose pack; Take as directed on package instructions.  Dispense: 1 each; Refill: 0  -     diclofenac (VOLTAREN) 50 MG EC tablet; Take 1 tablet by mouth 2 (Two) Times a Day As Needed (pain).  Dispense: 60 tablet; Refill: 2    4. Sprain of left rotator cuff capsule, initial encounter    5. Class 1 obesity due to excess calories without serious comorbidity with body mass index (BMI) of 31.0 to 31.9 in adult  -     Estrogens, Total  -     Follicle Stimulating Hormone  -     Luteinizing Hormone  -     Progesterone  -     Semaglutide-Weight Management (semaglutide) 0.25 MG/0.5ML solution auto-injector; Inject 0.25 mg under the skin into the appropriate area as directed 1 (One) Time Per Week.  Dispense: 0.5 mL; Refill: 0    6. Gastroesophageal reflux disease without esophagitis  -     omeprazole (PrilOSEC) 20 MG capsule; Take 1 capsule by mouth Daily.  Dispense: 90 capsule; Refill: 1    7. Upper respiratory tract infection, unspecified type  -     amoxicillin (AMOXIL) 875 MG tablet; Take 1 tablet by mouth 2 (Two) Times a Day for 10 days.  Dispense: 20 tablet; Refill: 0    8. Need for COVID-19 vaccine  -     COVID-19 Vaccine (Pfizer)      Patient advised will do physical therapy or injection for left rotator cuff if unresolved with medication.    The patient is asked to make an attempt to improve diet and  exercise patterns to aid in medical management of this problem.  Recommend patient do 150 minutes of exercise per week and start logging her calories.      Follow Up     Return if symptoms worsen or fail to improve, for Pending results.    Patient was given instructions and counseling regarding her condition or for health maintenance advice. Please see specific information pulled into the AVS if appropriate.     GEORGIE Ponec

## 2021-12-03 ENCOUNTER — TELEPHONE (OUTPATIENT)
Dept: FAMILY MEDICINE CLINIC | Facility: CLINIC | Age: 46
End: 2021-12-03

## 2021-12-03 NOTE — TELEPHONE ENCOUNTER
Saxenda sent erx. We can try contrave next if not covered. Pt can also check her formulary to see if the cover any weight loss medications.

## 2021-12-03 NOTE — TELEPHONE ENCOUNTER
Caller: Bree Carol Y    Relationship: Self    Best call back number: [phone]    What medications are you currently taking:   Current Outpatient Medications on File Prior to Visit   Medication Sig Dispense Refill   • amoxicillin (AMOXIL) 875 MG tablet Take 1 tablet by mouth 2 (Two) Times a Day for 10 days. 20 tablet 0   • cetirizine (zyrTEC) 10 MG tablet TAKE ONE TABLET BY MOUTH DAILY 90 tablet 1   • cloNIDine (Catapres) 0.2 MG tablet Take 1 tablet by mouth every night at bedtime. 30 tablet 2   • cyclobenzaprine (FLEXERIL) 10 MG tablet Take 1 tablet by mouth 3 (Three) Times a Day As Needed for Muscle Spasms. 30 tablet 0   • diclofenac (VOLTAREN) 50 MG EC tablet Take 1 tablet by mouth 2 (Two) Times a Day As Needed (pain). 60 tablet 2   • Diclofenac Sodium (VOLTAREN) 1 % gel gel Apply 4 g topically to the appropriate area as directed 4 (Four) Times a Day As Needed (pain). 100 g 0   • fluticasone (Flonase) 50 MCG/ACT nasal spray 2 sprays into the nostril(s) as directed by provider Daily. 16 g 5   • hydroCHLOROthiazide (HYDRODIURIL) 25 MG tablet Take 25 mg by mouth Daily.     • methylPREDNISolone (MEDROL) 4 MG dose pack Take as directed on package instructions. 1 each 0   • montelukast (SINGULAIR) 10 MG tablet Take 1 tablet by mouth Daily. 90 tablet 3   • omeprazole (PrilOSEC) 20 MG capsule Take 1 capsule by mouth Daily. 90 capsule 1   • Semaglutide-Weight Management (semaglutide) 0.25 MG/0.5ML solution auto-injector Inject 0.25 mg under the skin into the appropriate area as directed 1 (One) Time Per Week. 0.5 mL 0     No current facility-administered medications on file prior to visit.          When did you start taking these medications:    Which medication are you concerned about: WEGOVY    Who prescribed you this medication: RUPERT MELO    What are your concerns: PATIENTS STATES THAT THIS MEDICATION IS NOT COVERED ON HER INSURANCE AND IS REQUESTING SOMETHING THAT IS COVERED.     [facility] -  [place], [place] - [address] AT [address] - [phone]  - [phone] FX  [phone]

## 2021-12-04 LAB — ESTROGEN SERPL-MCNC: 203 PG/ML

## 2021-12-06 RX ORDER — HYDROCHLOROTHIAZIDE 25 MG/1
TABLET ORAL
Qty: 90 TABLET | OUTPATIENT
Start: 2021-12-06

## 2021-12-08 ENCOUNTER — TELEPHONE (OUTPATIENT)
Dept: FAMILY MEDICINE CLINIC | Facility: CLINIC | Age: 46
End: 2021-12-08

## 2021-12-08 NOTE — TELEPHONE ENCOUNTER
lauren message sent    ----- Message from GEORGIE Ponce sent at 12/6/2021  5:31 PM EST -----  Normal estrogen level.

## 2021-12-13 RX ORDER — HYDROCHLOROTHIAZIDE 25 MG/1
25 TABLET ORAL DAILY
Qty: 90 TABLET | Refills: 3 | Status: SHIPPED | OUTPATIENT
Start: 2021-12-13 | End: 2022-07-28

## 2022-01-27 DIAGNOSIS — N95.1 MENOPAUSAL FLUSHING: ICD-10-CM

## 2022-01-27 RX ORDER — CLONIDINE HYDROCHLORIDE 0.2 MG/1
0.2 TABLET ORAL
Qty: 30 TABLET | Refills: 2 | Status: SHIPPED | OUTPATIENT
Start: 2022-01-27 | End: 2022-05-19

## 2022-02-11 ENCOUNTER — LAB (OUTPATIENT)
Dept: ONCOLOGY | Facility: HOSPITAL | Age: 47
End: 2022-02-11

## 2022-02-11 DIAGNOSIS — C43.71 MALIGNANT MELANOMA OF RIGHT LOWER LEG: ICD-10-CM

## 2022-02-11 LAB
ALBUMIN SERPL-MCNC: 4.42 G/DL (ref 3.5–5.2)
ALBUMIN/GLOB SERPL: 1.5 G/DL
ALP SERPL-CCNC: 110 U/L (ref 39–117)
ALT SERPL W P-5'-P-CCNC: 13 U/L (ref 1–33)
ANION GAP SERPL CALCULATED.3IONS-SCNC: 9.9 MMOL/L (ref 5–15)
AST SERPL-CCNC: 14 U/L (ref 1–32)
BASOPHILS # BLD AUTO: 0.01 10*3/MM3 (ref 0–0.2)
BASOPHILS NFR BLD AUTO: 0.1 % (ref 0–1.5)
BILIRUB SERPL-MCNC: 0.4 MG/DL (ref 0–1.2)
BUN SERPL-MCNC: 7 MG/DL (ref 6–20)
BUN/CREAT SERPL: 11.7 (ref 7–25)
CALCIUM SPEC-SCNC: 9.1 MG/DL (ref 8.6–10.5)
CHLORIDE SERPL-SCNC: 103 MMOL/L (ref 98–107)
CO2 SERPL-SCNC: 25.1 MMOL/L (ref 22–29)
CREAT SERPL-MCNC: 0.6 MG/DL (ref 0.57–1)
DEPRECATED RDW RBC AUTO: 44 FL (ref 37–54)
EOSINOPHIL # BLD AUTO: 0.16 10*3/MM3 (ref 0–0.4)
EOSINOPHIL NFR BLD AUTO: 1.6 % (ref 0.3–6.2)
ERYTHROCYTE [DISTWIDTH] IN BLOOD BY AUTOMATED COUNT: 13.5 % (ref 12.3–15.4)
GFR SERPL CREATININE-BSD FRML MDRD: 107 ML/MIN/1.73
GFR SERPL CREATININE-BSD FRML MDRD: 130 ML/MIN/1.73
GLOBULIN UR ELPH-MCNC: 2.9 GM/DL
GLUCOSE SERPL-MCNC: 100 MG/DL (ref 65–99)
HCT VFR BLD AUTO: 39.3 % (ref 34–46.6)
HGB BLD-MCNC: 13 G/DL (ref 12–15.9)
IMM GRANULOCYTES # BLD AUTO: 0.01 10*3/MM3 (ref 0–0.05)
IMM GRANULOCYTES NFR BLD AUTO: 0.1 % (ref 0–0.5)
LDH SERPL-CCNC: 183 U/L (ref 135–214)
LYMPHOCYTES # BLD AUTO: 3.75 10*3/MM3 (ref 0.7–3.1)
LYMPHOCYTES NFR BLD AUTO: 38.2 % (ref 19.6–45.3)
MCH RBC QN AUTO: 29.2 PG (ref 26.6–33)
MCHC RBC AUTO-ENTMCNC: 33.1 G/DL (ref 31.5–35.7)
MCV RBC AUTO: 88.3 FL (ref 79–97)
MONOCYTES # BLD AUTO: 0.46 10*3/MM3 (ref 0.1–0.9)
MONOCYTES NFR BLD AUTO: 4.7 % (ref 5–12)
NEUTROPHILS NFR BLD AUTO: 5.42 10*3/MM3 (ref 1.7–7)
NEUTROPHILS NFR BLD AUTO: 55.3 % (ref 42.7–76)
PLATELET # BLD AUTO: 394 10*3/MM3 (ref 140–450)
PMV BLD AUTO: 9.4 FL (ref 6–12)
POTASSIUM SERPL-SCNC: 3.7 MMOL/L (ref 3.5–5.2)
PROT SERPL-MCNC: 7.3 G/DL (ref 6–8.5)
RBC # BLD AUTO: 4.45 10*6/MM3 (ref 3.77–5.28)
SODIUM SERPL-SCNC: 138 MMOL/L (ref 136–145)
WBC NRBC COR # BLD: 9.81 10*3/MM3 (ref 3.4–10.8)

## 2022-02-11 PROCEDURE — 36415 COLL VENOUS BLD VENIPUNCTURE: CPT

## 2022-02-11 PROCEDURE — 80053 COMPREHEN METABOLIC PANEL: CPT

## 2022-02-11 PROCEDURE — 83615 LACTATE (LD) (LDH) ENZYME: CPT

## 2022-02-11 PROCEDURE — 85025 COMPLETE CBC W/AUTO DIFF WBC: CPT

## 2022-02-16 ENCOUNTER — OFFICE VISIT (OUTPATIENT)
Dept: FAMILY MEDICINE CLINIC | Facility: CLINIC | Age: 47
End: 2022-02-16

## 2022-02-16 VITALS
SYSTOLIC BLOOD PRESSURE: 108 MMHG | BODY MASS INDEX: 31.41 KG/M2 | DIASTOLIC BLOOD PRESSURE: 61 MMHG | WEIGHT: 184 LBS | HEART RATE: 63 BPM | TEMPERATURE: 98.1 F | OXYGEN SATURATION: 99 % | HEIGHT: 64 IN

## 2022-02-16 DIAGNOSIS — R73.01 IMPAIRED FASTING GLUCOSE: ICD-10-CM

## 2022-02-16 DIAGNOSIS — N95.0 POSTMENOPAUSAL BLEEDING: ICD-10-CM

## 2022-02-16 DIAGNOSIS — E78.5 HYPERLIPIDEMIA, UNSPECIFIED HYPERLIPIDEMIA TYPE: Primary | ICD-10-CM

## 2022-02-16 DIAGNOSIS — R00.2 PALPITATIONS: ICD-10-CM

## 2022-02-16 LAB
CHOLEST SERPL-MCNC: 224 MG/DL (ref 0–200)
HDLC SERPL-MCNC: 32 MG/DL (ref 40–60)
LDLC SERPL CALC-MCNC: 149 MG/DL (ref 0–100)
LDLC/HDLC SERPL: 4.53 {RATIO}
TRIGL SERPL-MCNC: 236 MG/DL (ref 0–150)
VLDLC SERPL-MCNC: 43 MG/DL (ref 5–40)

## 2022-02-16 PROCEDURE — 80061 LIPID PANEL: CPT | Performed by: NURSE PRACTITIONER

## 2022-02-16 PROCEDURE — 99213 OFFICE O/P EST LOW 20 MIN: CPT | Performed by: NURSE PRACTITIONER

## 2022-02-16 PROCEDURE — 36415 COLL VENOUS BLD VENIPUNCTURE: CPT | Performed by: NURSE PRACTITIONER

## 2022-02-16 NOTE — PROGRESS NOTES
Chief Complaint  Follow-up and Hyperlipidemia    Subjective          Carol Giron is a 46 y.o. female who presents to [facility]    History of Present Illness    Answers for HPI/ROS submitted by the patient on 2/10/2022  Please describe your symptoms.: Follow up but also want to discuss caffeine/heart  Have you had these symptoms before?: Yes  How long have you been having these symptoms?: Greater than 2 weeks  Please list any medications you are currently taking for this condition.: none  Please describe any probable cause for these symptoms. : notice increase with caffeine  What is the primary reason for your visit?: Other    Pt reports with Mt. Dew she will have palpitations and heart rate is 70's.     Pedal edema improved with water pill.     Night sweats improved with clonidine. Menses Fco 10-24.22, lasting 15 days. Prior menses was June 2021.    Hx of melanoma - pt continues follow up with oncology.     PHQ-2 Total Score: 0   PHQ-9 Total Score: 0       Review of Systems   Constitutional: Negative for chills, fatigue and fever.   Respiratory: Negative for cough and shortness of breath.    Cardiovascular: Positive for palpitations. Negative for chest pain.   Gastrointestinal: Negative for constipation, diarrhea, nausea and vomiting.   Musculoskeletal: Negative for back pain and neck pain.   Skin: Negative for rash.   Neurological: Negative for dizziness and headaches.          Medical History: has a past medical history of Cancer (HCC) (2016), Chronic allergic rhinitis, History of melanoma (07/13/2018), HLD (hyperlipidemia), IC (interstitial cystitis), Limb pain, Limb swelling, and Multinodular goiter (07/13/2018).     Surgical History: has a past surgical history that includes Knee arthroscopy (Right, 12/08/2006); Colonoscopy (12/08/2006); Tubal ligation; Cosmetic surgery (2014); and Colonoscopy (N/A, 11/5/2021).     Family History: family history includes Arthritis in her  mother; Diabetes in her paternal grandmother; Heart disease in her paternal grandfather and paternal grandmother; Thyroid cancer in her maternal aunt.     Social History: reports that she has been smoking cigarettes. She has a 3.75 pack-year smoking history. She has never used smokeless tobacco. She reports current alcohol use. She reports that she does not use drugs.    Allergies: Patient has no known allergies.      Health Maintenance Due   Topic Date Due   • ANNUAL PHYSICAL  Never done   • Pneumococcal Vaccine 0-64 (1 of 2 - PPSV23) Never done   • HEPATITIS C SCREENING  Never done            Current Outpatient Medications:   •  cetirizine (zyrTEC) 10 MG tablet, TAKE ONE TABLET BY MOUTH DAILY, Disp: 90 tablet, Rfl: 1  •  cloNIDine (Catapres) 0.2 MG tablet, Take 1 tablet by mouth every night at bedtime., Disp: 30 tablet, Rfl: 2  •  cyclobenzaprine (FLEXERIL) 10 MG tablet, Take 1 tablet by mouth 3 (Three) Times a Day As Needed for Muscle Spasms., Disp: 30 tablet, Rfl: 0  •  diclofenac (VOLTAREN) 50 MG EC tablet, Take 1 tablet by mouth 2 (Two) Times a Day As Needed (pain)., Disp: 60 tablet, Rfl: 2  •  Diclofenac Sodium (VOLTAREN) 1 % gel gel, Apply 4 g topically to the appropriate area as directed 4 (Four) Times a Day As Needed (pain)., Disp: 100 g, Rfl: 0  •  fluticasone (Flonase) 50 MCG/ACT nasal spray, 2 sprays into the nostril(s) as directed by provider Daily., Disp: 16 g, Rfl: 5  •  hydroCHLOROthiazide (HYDRODIURIL) 25 MG tablet, Take 1 tablet by mouth Daily., Disp: 90 tablet, Rfl: 3  •  montelukast (SINGULAIR) 10 MG tablet, Take 1 tablet by mouth Daily., Disp: 90 tablet, Rfl: 3  •  omeprazole (PrilOSEC) 20 MG capsule, Take 1 capsule by mouth Daily., Disp: 90 capsule, Rfl: 1      Immunization History   Administered Date(s) Administered   • COVID-19 (DAYDAY) 04/08/2021   • COVID-19 (PFIZER) PURPLE CAP 12/02/2021   • Hepatitis A 05/31/2018   • Influenza, Unspecified 11/03/2020, 10/21/2021   • Tdap 07/21/2021  "        Objective       Vitals:    02/16/22 1447   BP: 108/61   BP Location: Left arm   Patient Position: Sitting   Cuff Size: Adult   Pulse: 63   Temp: 98.1 °F (36.7 °C)   TempSrc: Temporal   SpO2: 99%   Weight: 83.5 kg (184 lb)   Height: 162.6 cm (64.02\")      Body mass index is 31.57 kg/m².   Wt Readings from Last 3 Encounters:   02/16/22 83.5 kg (184 lb)   12/02/21 84.4 kg (186 lb)   11/05/21 82 kg (180 lb 12.4 oz)      BP Readings from Last 3 Encounters:   02/16/22 108/61   12/02/21 112/66   11/05/21 115/80        Physical Exam  Vitals reviewed.   Constitutional:       Appearance: Normal appearance. She is well-developed.   HENT:      Head: Normocephalic and atraumatic.   Eyes:      Conjunctiva/sclera: Conjunctivae normal.      Pupils: Pupils are equal, round, and reactive to light.   Cardiovascular:      Rate and Rhythm: Normal rate and regular rhythm.      Heart sounds: Normal heart sounds. No murmur heard.      Pulmonary:      Effort: Pulmonary effort is normal.      Breath sounds: Normal breath sounds. No wheezing or rhonchi.   Abdominal:      General: Bowel sounds are normal. There is no distension.      Palpations: Abdomen is soft.      Tenderness: There is no abdominal tenderness.   Skin:     General: Skin is warm and dry.   Neurological:      Mental Status: She is alert and oriented to person, place, and time.   Psychiatric:         Mood and Affect: Mood and affect normal.         Behavior: Behavior normal.         Thought Content: Thought content normal.         Judgment: Judgment normal.             Result Review :     CMP    CMP 6/25/21 2/11/22   Glucose 86 100 (A)   BUN 7 7   Creatinine 0.51 (A) 0.60   eGFR Non  Am 129 107   eGFR African Am >150 130   Sodium 138 138   Potassium 3.8 3.7   Chloride 103 103   Calcium 8.9 9.1   Albumin 4.00 4.42   Total Bilirubin 0.3 0.4   Alkaline Phosphatase 100 110   AST (SGOT) 22 14   ALT (SGPT) 20 13   (A) Abnormal value            Lipid Panel    Lipid Panel " 7/9/21   Total Cholesterol 217 (A)   Triglycerides 249 (A)   HDL Cholesterol 35 (A)   VLDL Cholesterol 45 (A)   LDL Cholesterol  137 (A)   LDL/HDL Ratio 3.78   (A) Abnormal value                         Assessment and Plan        Diagnoses and all orders for this visit:    1. Hyperlipidemia, unspecified hyperlipidemia type (Primary)  -     Lipid Panel    2. Impaired fasting glucose    3. Palpitations  Comments:  Wean from caffiene.    4. Postmenopausal bleeding  -     US Non-ob Transvaginal; Future          Follow Up     Return in about 6 months (around 8/16/2022) for Next scheduled follow up.    Patient was given instructions and counseling regarding her condition or for health maintenance advice. Please see specific information pulled into the AVS if appropriate.     Sindy Holman APRN

## 2022-02-16 NOTE — PATIENT INSTRUCTIONS
"High Cholesterol    High cholesterol is a condition in which the blood has high levels of a white, waxy substance similar to fat (cholesterol). The liver makes all the cholesterol that the body needs. The human body needs small amounts of cholesterol to help build cells. A person gets extra or excess cholesterol from the food that he or she eats.  The blood carries cholesterol from the liver to the rest of the body. If you have high cholesterol, deposits (plaques) may build up on the walls of your arteries. Arteries are the blood vessels that carry blood away from your heart. These plaques make the arteries narrow and stiff.  Cholesterol plaques increase your risk for heart attack and stroke. Work with your health care provider to keep your cholesterol levels in a healthy range.  What increases the risk?  The following factors may make you more likely to develop this condition:  · Eating foods that are high in animal fat (saturated fat) or cholesterol.  · Being overweight.  · Not getting enough exercise.  · A family history of high cholesterol (familial hypercholesterolemia).  · Use of tobacco products.  · Having diabetes.  What are the signs or symptoms?  There are no symptoms of this condition.  How is this diagnosed?  This condition may be diagnosed based on the results of a blood test.  · If you are older than 20 years of age, your health care provider may check your cholesterol levels every 4-6 years.  · You may be checked more often if you have high cholesterol or other risk factors for heart disease.  The blood test for cholesterol measures:  · \"Bad\" cholesterol, or LDL cholesterol. This is the main type of cholesterol that causes heart disease. The desired level is less than 100 mg/dL.  · \"Good\" cholesterol, or HDL cholesterol. HDL helps protect against heart disease by cleaning the arteries and carrying the LDL to the liver for processing. The desired level for HDL is 60 mg/dL or higher.  · Triglycerides. " These are fats that your body can store or burn for energy. The desired level is less than 150 mg/dL.  · Total cholesterol. This measures the total amount of cholesterol in your blood and includes LDL, HDL, and triglycerides. The desired level is less than 200 mg/dL.  How is this treated?  This condition may be treated with:  · Diet changes. You may be asked to eat foods that have more fiber and less saturated fats or added sugar.  · Lifestyle changes. These may include regular exercise, maintaining a healthy weight, and quitting use of tobacco products.  · Medicines. These are given when diet and lifestyle changes have not worked. You may be prescribed a statin medicine to help lower your cholesterol levels.  Follow these instructions at home:  Eating and drinking    · Eat a healthy, balanced diet. This diet includes:  ? Daily servings of a variety of fresh, frozen, or canned fruits and vegetables.  ? Daily servings of whole grain foods that are rich in fiber.  ? Foods that are low in saturated fats and trans fats. These include poultry and fish without skin, lean cuts of meat, and low-fat dairy products.  ? A variety of fish, especially oily fish that contain omega-3 fatty acids. Aim to eat fish at least 2 times a week.  · Avoid foods and drinks that have added sugar.  · Use healthy cooking methods, such as roasting, grilling, broiling, baking, poaching, steaming, and stir-frying. Do not mitchell your food except for stir-frying.    Lifestyle    · Get regular exercise. Aim to exercise for a total of 150 minutes a week. Increase your activity level by doing activities such as gardening, walking, and taking the stairs.  · Do not use any products that contain nicotine or tobacco, such as cigarettes, e-cigarettes, and chewing tobacco. If you need help quitting, ask your health care provider.    General instructions  · Take over-the-counter and prescription medicines only as told by your health care provider.  · Keep all  "follow-up visits as told by your health care provider. This is important.  Where to find more information  · American Heart Association: www.heart.org  · National Heart, Lung, and Blood [employer]: www.nhlbi.nih.gov  Contact a health care provider if:  · You have trouble achieving or maintaining a healthy diet or weight.  · You are starting an exercise program.  · You are unable to stop smoking.  Get help right away if:  · You have chest pain.  · You have trouble breathing.  · You have any symptoms of a stroke. \"BE FAST\" is an easy way to remember the main warning signs of a stroke:  ? B - Balance. Signs are dizziness, sudden trouble walking, or loss of balance.  ? E - Eyes. Signs are trouble seeing or a sudden change in vision.  ? F - Face. Signs are sudden weakness or numbness of the face, or the face or eyelid drooping on one side.  ? A - Arms. Signs are weakness or numbness in an arm. This happens suddenly and usually on one side of the body.  ? S - Speech. Signs are sudden trouble speaking, slurred speech, or trouble understanding what people say.  ? T - Time. Time to call emergency services. Write down what time symptoms started.  · You have other signs of a stroke, such as:  ? A sudden, severe headache with no known cause.  ? Nausea or vomiting.  ? Seizure.  These symptoms may represent a serious problem that is an emergency. Do not wait to see if the symptoms will go away. Get medical help right away. Call your local emergency services (911 in the [place] Do not drive yourself to the hospital.  Summary  · Cholesterol plaques increase your risk for heart attack and stroke. Work with your health care provider to keep your cholesterol levels in a healthy range.  · Eat a healthy, balanced diet, get regular exercise, and maintain a healthy weight.  · Do not use any products that contain nicotine or tobacco, such as cigarettes, e-cigarettes, and chewing tobacco.  · Get help right away if you have any symptoms of a " stroke.  This information is not intended to replace advice given to you by your health care provider. Make sure you discuss any questions you have with your health care provider.  Document Revised: 11/16/2020 Document Reviewed: 11/16/2020  Elsevier Patient Education © 2021 [employer]

## 2022-02-17 DIAGNOSIS — E78.5 HYPERLIPIDEMIA, UNSPECIFIED HYPERLIPIDEMIA TYPE: Primary | ICD-10-CM

## 2022-02-17 RX ORDER — PRAVASTATIN SODIUM 10 MG
10 TABLET ORAL DAILY
Qty: 30 TABLET | Refills: 2 | Status: SHIPPED | OUTPATIENT
Start: 2022-02-17 | End: 2022-07-28

## 2022-02-18 ENCOUNTER — OFFICE VISIT (OUTPATIENT)
Dept: ONCOLOGY | Facility: HOSPITAL | Age: 47
End: 2022-02-18

## 2022-02-18 VITALS
RESPIRATION RATE: 16 BRPM | HEART RATE: 73 BPM | DIASTOLIC BLOOD PRESSURE: 58 MMHG | WEIGHT: 181.66 LBS | TEMPERATURE: 97.2 F | OXYGEN SATURATION: 97 % | BODY MASS INDEX: 31.17 KG/M2 | SYSTOLIC BLOOD PRESSURE: 108 MMHG

## 2022-02-18 DIAGNOSIS — C43.71 MALIGNANT MELANOMA OF RIGHT LOWER LEG: Primary | ICD-10-CM

## 2022-02-18 PROCEDURE — 99213 OFFICE O/P EST LOW 20 MIN: CPT | Performed by: NURSE PRACTITIONER

## 2022-02-18 PROCEDURE — G0463 HOSPITAL OUTPT CLINIC VISIT: HCPCS | Performed by: NURSE PRACTITIONER

## 2022-02-18 NOTE — PROGRESS NOTES
Chief Complaint  Melanoma    Holman, Sindy, APRN  Holman, Sindy, APRN      Subjective          Carol Giron presents to [facility] for yearly follow up for melanoma.     History of Present Illness     Ms. 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 She is on year 6 of surveillance. She follows with dermatology once a year. She feels well overall. She denies any persistent headaches, vision changes, cough, bone or back pain or abdominal pain. She has not noticed any changes in her previous melanoma surgery.     She had recent labs completed on 2/11/2022 and CMP,  LDH CBC with differential is acceptable. She is up to date on her cancer screenings. Normal colonoscopy in November of 2021. Mammograms have been benign.     Cancer Staging  No matching staging information was found for the patient.     Treatment intent: curative    Oncology/Hematology History    No history exists.   This is a very pleasant 41-year-old female with history of stage IIa melanoma     who presents to oncology clinic to establish care.  Since patient's melanoma     diagnosis, wide excision of her right calf she has had no systemic symptoms.      She follows up with Dr. Hitchcock for routine skin checks every 3 months.  No weight    loss, headaches, or new GI symptoms.  She is completely asymptomatic.  Of note     patient has a history of multinodular goiter.            1)  Invasive Superficial spreading Melanoma: dx: 6/20/2016. Re-excision to the     right calf leg. tumor size: outside biopsy did not give tumor dimensions. No     residual tumor in the wide excision specimen. No macroscopic satellite nodules     noted. No residual melanoma. Tumor thickness: 1.30 mm, Ulceration is present.     Margins uninvolved. SLN mapping and bx: No LN mets. Staging: Stage IIA.             Treatment:       Liver function: Elevated ALT 59, alk  phos 107. normal AST 38. (7/30/19)      CT CAP: No evidence of metastatic disease. (8/7/2019)      Plan for MRI of liver. (8/14/19)      Surveilance with dermatology yearly and oncology yearly. (2/18/21)        Review of Systems   Constitutional: Positive for fatigue. Negative for appetite change, diaphoresis, fever, unexpected weight gain and unexpected weight loss.   HENT: Negative for hearing loss, mouth sores, sore throat, swollen glands, trouble swallowing and voice change.    Eyes: Negative for blurred vision.   Respiratory: Negative for cough, shortness of breath and wheezing.    Cardiovascular: Negative for chest pain and palpitations.   Gastrointestinal: Negative for abdominal pain, blood in stool, constipation, diarrhea, nausea and vomiting.   Endocrine: Negative for cold intolerance and heat intolerance.   Genitourinary: Negative for difficulty urinating, dysuria, frequency, hematuria and urinary incontinence.   Musculoskeletal: Negative for arthralgias, back pain and myalgias.   Skin: Negative for rash, skin lesions and wound.   Neurological: Negative for dizziness, seizures, weakness, numbness and headache.   Hematological: Does not bruise/bleed easily.   Psychiatric/Behavioral: Negative for depressed mood. The patient is not nervous/anxious.    All other systems reviewed and are negative.      Current Outpatient Medications on File Prior to Visit   Medication Sig Dispense Refill   • cetirizine (zyrTEC) 10 MG tablet TAKE ONE TABLET BY MOUTH DAILY 90 tablet 1   • cloNIDine (Catapres) 0.2 MG tablet Take 1 tablet by mouth every night at bedtime. 30 tablet 2   • cyclobenzaprine (FLEXERIL) 10 MG tablet Take 1 tablet by mouth 3 (Three) Times a Day As Needed for Muscle Spasms. 30 tablet 0   • diclofenac (VOLTAREN) 50 MG EC tablet Take 1 tablet by mouth 2 (Two) Times a Day As Needed (pain). 60 tablet 2   • Diclofenac Sodium (VOLTAREN) 1 % gel gel Apply 4 g topically to the appropriate area as directed 4 (Four)  Times a Day As Needed (pain). 100 g 0   • fluticasone (Flonase) 50 MCG/ACT nasal spray 2 sprays into the nostril(s) as directed by provider Daily. 16 g 5   • hydroCHLOROthiazide (HYDRODIURIL) 25 MG tablet Take 1 tablet by mouth Daily. 90 tablet 3   • montelukast (SINGULAIR) 10 MG tablet Take 1 tablet by mouth Daily. 90 tablet 3   • omeprazole (PrilOSEC) 20 MG capsule Take 1 capsule by mouth Daily. 90 capsule 1   • pravastatin (PRAVACHOL) 10 MG tablet Take 1 tablet by mouth Daily. 30 tablet 2     No current facility-administered medications on file prior to visit.       No Known Allergies  Past Medical History:   Diagnosis Date   • Cancer (HCC) 2016    Melanoma   • Chronic allergic rhinitis    • History of melanoma 07/13/2018   • HLD (hyperlipidemia)    • IC (interstitial cystitis)    • Limb pain    • Limb swelling     right leg    • Multinodular goiter 07/13/2018     Past Surgical History:   Procedure Laterality Date   • COLONOSCOPY  12/08/2006   • COLONOSCOPY N/A 11/5/2021    Procedure: COLONOSCOPY;  Surgeon: Francisca Olvera MD;  Location: [facility] END[facility]COPY;  Service: Gastroenterology;  Laterality: N/A;   • COSMETIC SURGERY  2014    Scar revision   • KNEE ARTHROSCOPY Right 12/08/2006    debridgement of meniscus tear of right knee   • TUBAL ABDOMINAL LIGATION       Social History     Socioeconomic History   • Marital status:    • Number of children: 2   Tobacco Use   • Smoking status: Current Every Day Smoker     Packs/day: 0.25     Years: 15.00     Pack years: 3.75     Types: Cigarettes   • Smokeless tobacco: Never Used   Vaping Use   • Vaping Use: Never used   Substance and Sexual Activity   • Alcohol use: Yes     Alcohol/week: 0.0 standard drinks     Comment: rarely   • Drug use: Never   • Sexual activity: Yes     Partners: Male     Birth control/protection: Surgical     Family History   Problem Relation Age of Onset   • Diabetes Paternal Grandmother         unspecified type   • Heart disease  Paternal Grandmother    • Heart disease Paternal Grandfather         unknown age later in life 60s   • Arthritis Mother    • Thyroid cancer Maternal Aunt    • Malig Hyperthermia Neg Hx      Immunization History   Administered Date(s) Administered   • COVID-19 (DAYDAY) 04/08/2021   • COVID-19 (PFIZER) PURPLE CAP 12/02/2021   • Hepatitis A 05/31/2018   • Influenza, Unspecified 11/03/2020, 10/21/2021   • Tdap 07/21/2021       Objective   Physical Exam  Nursing note reviewed.   Constitutional:       Appearance: She is normal weight.   HENT:      Head: Normocephalic.      Nose: Nose normal.      Mouth/Throat:      Mouth: Mucous membranes are moist.   Eyes:      Pupils: Pupils are equal, round, and reactive to light.   Cardiovascular:      Rate and Rhythm: Normal rate and regular rhythm.      Pulses: Normal pulses.      Heart sounds: Normal heart sounds. No murmur heard.      Pulmonary:      Effort: Pulmonary effort is normal. No respiratory distress.      Breath sounds: Normal breath sounds.   Abdominal:      General: Bowel sounds are normal.      Palpations: Abdomen is soft.   Musculoskeletal:         General: Normal range of motion.      Cervical back: Normal range of motion.   Skin:     General: Skin is warm and dry.      Capillary Refill: Capillary refill takes less than 2 seconds.   Neurological:      General: No focal deficit present.      Mental Status: She is alert and oriented to person, place, and time.   Psychiatric:         Mood and Affect: Mood normal.         Behavior: Behavior normal.         Thought Content: Thought content normal.         Judgment: Judgment normal.         Vitals:    02/18/22 1005   BP: 108/58   Pulse: 73   Resp: 16   Temp: 97.2 °F (36.2 °C)   SpO2: 97%   Weight: 82.4 kg (181 lb 10.5 oz)   PainSc: 0-No pain     ECOG score: 0         ECOG: (0) Fully Active - Able to Carry On All Pre-disease Performance Without Restriction  Fall Risk Assessment was completed, and patient is at low risk  for falls.  PHQ-9 Total Score: 0       The patient is not  experiencing fatigue. Fatigue score: 0    PT/OT Functional Screening: PT fx screen: No needs identified  Speech Functional Screening: Speech fx screen: No needs identified  Rehab to be ordered: Rehab to be ordered: No needs identified        Result Review :   The following data was reviewed by: GEORGIE Sol on 02/18/2022:  Lab Results   Component Value Date    HGB 13.0 02/11/2022    HCT 39.3 02/11/2022    MCV 88.3 02/11/2022     02/11/2022    WBC 9.81 02/11/2022    NEUTROABS 5.42 02/11/2022    LYMPHSABS 3.75 (H) 02/11/2022    MONOSABS 0.46 02/11/2022    EOSABS 0.16 02/11/2022    BASOSABS 0.01 02/11/2022     Lab Results   Component Value Date    GLUCOSE 100 (H) 02/11/2022    BUN 7 02/11/2022    CREATININE 0.60 02/11/2022     02/11/2022    K 3.7 02/11/2022     02/11/2022    CO2 25.1 02/11/2022    CALCIUM 9.1 02/11/2022    PROTEINTOT 7.3 02/11/2022    ALBUMIN 4.42 02/11/2022    BILITOT 0.4 02/11/2022    ALKPHOS 110 02/11/2022    AST 14 02/11/2022    ALT 13 02/11/2022          Assessment and Plan    Diagnoses and all orders for this visit:    1. Malignant melanoma of right lower leg (HCC) (Primary)  -     CBC & Differential; Future  -     Comprehensive Metabolic Panel; Future  -     Lactate Dehydrogenase; Future    We discussed she could follow with her PCP for surveilance if she wishes to. She would like to continue following here with yearly follow up.     She will call if she has any persistent symptomatolgy including persistent headaches, vision changes, confusion, cough / SOA, abdominal pain or bone or back pain.     Recheck labs in 1 year with CBC, CMP, LDH.         Patient Follow Up: 1 year with NP.     Patient was given instructions and counseling regarding her condition or for health maintenance advice. Please see specific information pulled into the AVS if appropriate.     Rani Frederick  APRN    2/18/2022

## 2022-03-18 ENCOUNTER — HOSPITAL ENCOUNTER (OUTPATIENT)
Dept: ULTRASOUND IMAGING | Facility: HOSPITAL | Age: 47
Discharge: HOME | End: 2022-03-18
Admitting: NURSE PRACTITIONER

## 2022-03-18 DIAGNOSIS — N95.0 POSTMENOPAUSAL BLEEDING: ICD-10-CM

## 2022-03-18 PROCEDURE — 76830 TRANSVAGINAL US NON-OB: CPT

## 2022-05-19 DIAGNOSIS — N95.1 MENOPAUSAL FLUSHING: ICD-10-CM

## 2022-05-19 RX ORDER — CLONIDINE HYDROCHLORIDE 0.2 MG/1
TABLET ORAL
Qty: 30 TABLET | Refills: 2 | Status: SHIPPED | OUTPATIENT
Start: 2022-05-19 | End: 2022-07-28 | Stop reason: SDUPTHER

## 2022-06-28 ENCOUNTER — CLINICAL SUPPORT (OUTPATIENT)
Dept: FAMILY MEDICINE CLINIC | Facility: CLINIC | Age: 47
End: 2022-06-28

## 2022-06-28 DIAGNOSIS — E78.5 HYPERLIPIDEMIA, UNSPECIFIED HYPERLIPIDEMIA TYPE: ICD-10-CM

## 2022-06-28 PROCEDURE — 36415 COLL VENOUS BLD VENIPUNCTURE: CPT | Performed by: NURSE PRACTITIONER

## 2022-06-28 PROCEDURE — 80061 LIPID PANEL: CPT | Performed by: NURSE PRACTITIONER

## 2022-06-28 PROCEDURE — 80053 COMPREHEN METABOLIC PANEL: CPT | Performed by: NURSE PRACTITIONER

## 2022-06-29 LAB
ALBUMIN SERPL-MCNC: 4.3 G/DL (ref 3.5–5.2)
ALBUMIN/GLOB SERPL: 1.7 G/DL
ALP SERPL-CCNC: 110 U/L (ref 39–117)
ALT SERPL W P-5'-P-CCNC: 24 U/L (ref 1–33)
ANION GAP SERPL CALCULATED.3IONS-SCNC: 12.9 MMOL/L (ref 5–15)
AST SERPL-CCNC: 22 U/L (ref 1–32)
BILIRUB SERPL-MCNC: 0.3 MG/DL (ref 0–1.2)
BUN SERPL-MCNC: 13 MG/DL (ref 6–20)
BUN/CREAT SERPL: 24.1 (ref 7–25)
CALCIUM SPEC-SCNC: 10.2 MG/DL (ref 8.6–10.5)
CHLORIDE SERPL-SCNC: 100 MMOL/L (ref 98–107)
CHOLEST SERPL-MCNC: 169 MG/DL (ref 0–200)
CO2 SERPL-SCNC: 24.1 MMOL/L (ref 22–29)
CREAT SERPL-MCNC: 0.54 MG/DL (ref 0.57–1)
EGFRCR SERPLBLD CKD-EPI 2021: 113.7 ML/MIN/1.73
GLOBULIN UR ELPH-MCNC: 2.6 GM/DL
GLUCOSE SERPL-MCNC: 77 MG/DL (ref 65–99)
HDLC SERPL-MCNC: 36 MG/DL (ref 40–60)
LDLC SERPL CALC-MCNC: 122 MG/DL (ref 0–100)
LDLC/HDLC SERPL: 3.38 {RATIO}
POTASSIUM SERPL-SCNC: 3.9 MMOL/L (ref 3.5–5.2)
PROT SERPL-MCNC: 6.9 G/DL (ref 6–8.5)
SODIUM SERPL-SCNC: 137 MMOL/L (ref 136–145)
TRIGL SERPL-MCNC: 56 MG/DL (ref 0–150)
VLDLC SERPL-MCNC: 11 MG/DL (ref 5–40)

## 2022-07-28 ENCOUNTER — OFFICE VISIT (OUTPATIENT)
Dept: FAMILY MEDICINE CLINIC | Facility: CLINIC | Age: 47
End: 2022-07-28

## 2022-07-28 VITALS
OXYGEN SATURATION: 100 % | DIASTOLIC BLOOD PRESSURE: 68 MMHG | TEMPERATURE: 97.4 F | BODY MASS INDEX: 25.85 KG/M2 | HEIGHT: 64 IN | SYSTOLIC BLOOD PRESSURE: 138 MMHG | WEIGHT: 151.4 LBS | HEART RATE: 57 BPM

## 2022-07-28 DIAGNOSIS — Z00.00 ANNUAL PHYSICAL EXAM: Primary | ICD-10-CM

## 2022-07-28 DIAGNOSIS — Z13.29 SCREENING FOR THYROID DISORDER: ICD-10-CM

## 2022-07-28 DIAGNOSIS — N95.1 MENOPAUSAL FLUSHING: ICD-10-CM

## 2022-07-28 DIAGNOSIS — J30.9 CHRONIC ALLERGIC RHINITIS: ICD-10-CM

## 2022-07-28 DIAGNOSIS — E78.2 MIXED HYPERLIPIDEMIA: ICD-10-CM

## 2022-07-28 PROBLEM — K21.9 GASTROESOPHAGEAL REFLUX DISEASE WITHOUT ESOPHAGITIS: Status: RESOLVED | Noted: 2021-12-02 | Resolved: 2022-07-28

## 2022-07-28 PROCEDURE — 99396 PREV VISIT EST AGE 40-64: CPT | Performed by: NURSE PRACTITIONER

## 2022-07-28 RX ORDER — CLONIDINE HYDROCHLORIDE 0.2 MG/1
0.2 TABLET ORAL
Qty: 90 TABLET | Refills: 3 | Status: SHIPPED | OUTPATIENT
Start: 2022-07-28

## 2022-07-28 RX ORDER — MONTELUKAST SODIUM 10 MG/1
10 TABLET ORAL DAILY
Qty: 90 TABLET | Refills: 3 | Status: SHIPPED | OUTPATIENT
Start: 2022-07-28

## 2022-07-28 NOTE — PATIENT INSTRUCTIONS
Preventive Care 40-64 Years Old, Female  Preventive care refers to lifestyle choices and visits with your health care provider that can promote health and wellness. This includes:  A yearly physical exam. This is also called an annual wellness visit.  Regular dental and eye exams.  Immunizations.  Screening for certain conditions.  Healthy lifestyle choices, such as:  Eating a healthy diet.  Getting regular exercise.  Not using drugs or products that contain nicotine and tobacco.  Limiting alcohol use.  What can I expect for my preventive care visit?  Physical exam  Your health care provider will check your:  Height and weight. These may be used to calculate your BMI (body mass index). BMI is a measurement that tells if you are at a healthy weight.  Heart rate and blood pressure.  Body temperature.  Skin for abnormal spots.  Counseling  Your health care provider may ask you questions about your:  Past medical problems.  Family's medical history.  Alcohol, tobacco, and drug use.  Emotional well-being.  Home life and relationship well-being.  Sexual activity.  Diet, exercise, and sleep habits.  Work and work environment.  Access to firearms.  Method of birth control.  Menstrual cycle.  Pregnancy history.  What immunizations do I need?    Vaccines are usually given at various ages, according to a schedule. Your health care provider will recommend vaccines for you based on your age, medical history, and lifestyle or other factors, such as travel or where you work.  What tests do I need?  Blood tests  Lipid and cholesterol levels. These may be checked every 5 years, or more often if you are over 50 years old.  Hepatitis C test.  Hepatitis B test.  Screening  Lung cancer screening. You may have this screening every year starting at age 55 if you have a 30-pack-year history of smoking and currently smoke or have quit within the past 15 years.  Colorectal cancer screening.  All adults should have this screening starting at  age 50 and continuing until age 75.  Your health care provider may recommend screening at age 45 if you are at increased risk.  You will have tests every 1-10 years, depending on your results and the type of screening test.  Diabetes screening.  This is done by checking your blood sugar (glucose) after you have not eaten for a while (fasting).  You may have this done every 1-3 years.  Mammogram.  This may be done every 1-2 years.  Talk with your health care provider about when you should start having regular mammograms. This may depend on whether you have a family history of breast cancer.  BRCA-related cancer screening. This may be done if you have a family history of breast, ovarian, tubal, or peritoneal cancers.  Pelvic exam and Pap test.  This may be done every 3 years starting at age 21.  Starting at age 30, this may be done every 5 years if you have a Pap test in combination with an HPV test.  Other tests  STD (sexually transmitted disease) testing, if you are at risk.  Bone density scan. This is done to screen for osteoporosis. You may have this scan if you are at high risk for osteoporosis.  Talk with your health care provider about your test results, treatment options, and if necessary, the need for more tests.  Follow these instructions at home:  Eating and drinking    Eat a diet that includes fresh fruits and vegetables, whole grains, lean protein, and low-fat dairy products.  Take vitamin and mineral supplements as recommended by your health care provider.  Do not drink alcohol if:  Your health care provider tells you not to drink.  You are pregnant, may be pregnant, or are planning to become pregnant.  If you drink alcohol:  Limit how much you have to 0-1 drink a day.  Be aware of how much alcohol is in your drink. In the [place] one drink equals one 12 oz bottle of beer (355 mL), one 5 oz glass of wine (148 mL), or one 1½ oz glass of hard liquor (44 mL).    Lifestyle  Take daily care of your teeth and  gums. Brush your teeth every morning and night with fluoride toothpaste. Floss one time each day.  Stay active. Exercise for at least 30 minutes 5 or more days each week.  Do not use any products that contain nicotine or tobacco, such as cigarettes, e-cigarettes, and chewing tobacco. If you need help quitting, ask your health care provider.  Do not use drugs.  If you are sexually active, practice safe sex. Use a condom or other form of protection to prevent STIs (sexually transmitted infections).  If you do not wish to become pregnant, use a form of birth control. If you plan to become pregnant, see your health care provider for a prepregnancy visit.  If told by your health care provider, take low-dose aspirin daily starting at age 50.  Find healthy ways to cope with stress, such as:  Meditation, yoga, or listening to music.  Journaling.  Talking to a trusted person.  Spending time with friends and family.  Safety  Always wear your seat belt while driving or riding in a vehicle.  Do not drive:  If you have been drinking alcohol. Do not ride with someone who has been drinking.  When you are tired or distracted.  While texting.  Wear a helmet and other protective equipment during sports activities.  If you have firearms in your house, make sure you follow all gun safety procedures.  What's next?  Visit your health care provider once a year for an annual wellness visit.  Ask your health care provider how often you should have your eyes and teeth checked.  Stay up to date on all vaccines.  This information is not intended to replace advice given to you by your health care provider. Make sure you discuss any questions you have with your health care provider.  Document Revised: 09/21/2021 Document Reviewed: 08/29/2019  [employer] Patient Education © 2021 [employer]

## 2022-07-28 NOTE — PROGRESS NOTES
Chief Complaint  Annual Exam and Hyperlipidemia    Subjective          Carol Giron is a 47 y.o. female who presents to [facility]    History of Present Illness    Annual exam    HLD - cholesterol is improved. LDL is 122. Pt is doing Optivua. Pt is eating 6 meals per day - 5 fuelings and 1 protein with 3 servings veggies. Pt stop mt. Dew. Drinking 100 oz of water per day.        Review of Systems   Constitutional: Negative for chills, fatigue and fever.   Respiratory: Negative for cough and shortness of breath.    Cardiovascular: Negative for chest pain and palpitations.   Gastrointestinal: Negative for constipation, diarrhea, nausea and vomiting.   Musculoskeletal: Negative for back pain and neck pain.   Skin: Negative for rash.   Neurological: Negative for dizziness and headaches.          Medical History: has a past medical history of Cancer (HCC) (2016), Chronic allergic rhinitis, History of melanoma (07/13/2018), HLD (hyperlipidemia), IC (interstitial cystitis), Limb pain, Limb swelling, and Multinodular goiter (07/13/2018).     Surgical History: has a past surgical history that includes Knee arthroscopy (Right, 12/08/2006); Colonoscopy (12/08/2006); Tubal ligation; Cosmetic surgery (2014); and Colonoscopy (N/A, 11/5/2021).     Family History: family history includes Arthritis in her mother; Diabetes in her paternal grandmother; Heart disease in her paternal grandfather and paternal grandmother; Thyroid cancer in her maternal aunt.     Social History: reports that she has been smoking cigarettes. She has a 3.75 pack-year smoking history. She has never used smokeless tobacco. She reports current alcohol use. She reports that she does not use drugs.    Allergies: Patient has no known allergies.      Health Maintenance Due   Topic Date Due   • ANNUAL PHYSICAL  Never done   • Pneumococcal Vaccine 0-64 (1 - PCV) Never done   • HEPATITIS C SCREENING  Never done            Current  "Outpatient Medications:   •  cetirizine (zyrTEC) 10 MG tablet, TAKE ONE TABLET BY MOUTH DAILY, Disp: 90 tablet, Rfl: 1  •  cloNIDine (CATAPRES) 0.2 MG tablet, Take 1 tablet by mouth every night at bedtime., Disp: 90 tablet, Rfl: 3  •  Diclofenac Sodium (VOLTAREN) 1 % gel gel, Apply 4 g topically to the appropriate area as directed 4 (Four) Times a Day As Needed (pain)., Disp: 100 g, Rfl: 0  •  fluticasone (Flonase) 50 MCG/ACT nasal spray, 2 sprays into the nostril(s) as directed by provider Daily., Disp: 16 g, Rfl: 5  •  montelukast (SINGULAIR) 10 MG tablet, Take 1 tablet by mouth Daily., Disp: 90 tablet, Rfl: 3      Immunization History   Administered Date(s) Administered   • COVID-19 (Send the Trend) 04/08/2021   • COVID-19 (PFIZER) PURPLE CAP 12/02/2021   • Hepatitis A 05/31/2018   • Influenza, Unspecified 11/03/2020, 10/21/2021   • Tdap 07/21/2021         Objective       Vitals:    07/28/22 1521   BP: 138/68   Pulse: 57   Temp: 97.4 °F (36.3 °C)   TempSrc: Temporal   SpO2: 100%   Weight: 68.7 kg (151 lb 6.4 oz)   Height: 162.6 cm (64\")      Body mass index is 25.99 kg/m².   Wt Readings from Last 3 Encounters:   07/28/22 68.7 kg (151 lb 6.4 oz)   02/18/22 82.4 kg (181 lb 10.5 oz)   02/16/22 83.5 kg (184 lb)      BP Readings from Last 3 Encounters:   07/28/22 138/68   02/18/22 108/58   02/16/22 108/61        Physical Exam  Vitals reviewed.   Constitutional:       Appearance: Normal appearance. She is well-developed.   HENT:      Head: Normocephalic and atraumatic.   Eyes:      Conjunctiva/sclera: Conjunctivae normal.      Pupils: Pupils are equal, round, and reactive to light.   Cardiovascular:      Rate and Rhythm: Normal rate and regular rhythm.      Heart sounds: Normal heart sounds. No murmur heard.  Pulmonary:      Effort: Pulmonary effort is normal.      Breath sounds: Normal breath sounds. No wheezing or rhonchi.   Abdominal:      General: Bowel sounds are normal. There is no distension.      Palpations: Abdomen " is soft.      Tenderness: There is no abdominal tenderness.   Skin:     General: Skin is warm and dry.   Neurological:      Mental Status: She is alert and oriented to person, place, and time.   Psychiatric:         Mood and Affect: Mood and affect normal.         Behavior: Behavior normal.         Thought Content: Thought content normal.         Judgment: Judgment normal.             Result Review :       Common labs    Common Labsle 2/11/22 2/11/22 2/16/22 6/28/22 6/28/22    1117 1117  1532 1532   Glucose  100 (A)   77   BUN  7   13   Creatinine  0.60   0.54 (A)   eGFR Non  Am  107      eGFR African Am  130      Sodium  138   137   Potassium  3.7   3.9   Chloride  103   100   Calcium  9.1   10.2   Albumin  4.42   4.30   Total Bilirubin  0.4   0.3   Alkaline Phosphatase  110   110   AST (SGOT)  14   22   ALT (SGPT)  13   24   WBC 9.81       Hemoglobin 13.0       Hematocrit 39.3       Platelets 394       Total Cholesterol   224 (A) 169    Triglycerides   236 (A) 56    HDL Cholesterol   32 (A) 36 (A)    LDL Cholesterol    149 (A) 122 (A)    (A) Abnormal value                         Assessment and Plan        Diagnoses and all orders for this visit:    1. Annual physical exam (Primary)    2. Chronic allergic rhinitis  -     montelukast (SINGULAIR) 10 MG tablet; Take 1 tablet by mouth Daily.  Dispense: 90 tablet; Refill: 3    3. Menopausal flushing  -     CBC (No Diff); Future  -     cloNIDine (CATAPRES) 0.2 MG tablet; Take 1 tablet by mouth every night at bedtime.  Dispense: 90 tablet; Refill: 3    4. Mixed hyperlipidemia  -     Lipid Panel; Future  -     Comprehensive Metabolic Panel; Future    5. Screening for thyroid disorder  -     TSH; Future      Follow Up     Return in about 1 year (around 7/28/2023) for Annual physical.    Patient was given instructions and counseling regarding her condition or for health maintenance advice. Please see specific information pulled into the AVS if appropriate.     Sindy  GEORGIE Holman

## 2022-08-31 ENCOUNTER — TELEPHONE (OUTPATIENT)
Dept: OBSTETRICS AND GYNECOLOGY | Facility: CLINIC | Age: 47
End: 2022-08-31

## 2022-10-06 ENCOUNTER — HOSPITAL ENCOUNTER (OUTPATIENT)
Dept: MAMMOGRAPHY | Facility: HOSPITAL | Age: 47
Discharge: HOME | End: 2022-10-06
Admitting: OBSTETRICS & GYNECOLOGY

## 2022-10-06 DIAGNOSIS — Z01.419 WELL WOMAN EXAM: ICD-10-CM

## 2022-10-06 PROCEDURE — 77063 BREAST TOMOSYNTHESIS BI: CPT

## 2022-10-06 PROCEDURE — 77067 SCR MAMMO BI INCL CAD: CPT

## 2022-10-12 NOTE — PROGRESS NOTES
Well Woman Visit    CC: Scheduled annual well gyn visit  Chief Complaint   Patient presents with   • Gynecologic Exam       Myriad intake in the past?: yes  Lifetime breast cancer risk >20%    HPI:   47 y.o.   Social History     Substance and Sexual Activity   Sexual Activity Yes   • Partners: Male   • Birth control/protection: Tubal ligation       48 y/o  with LMP 22 here for AP. Menses irregular q 3-4 months x 4 days not heavy.  No IMB occ hot flash No EV. Had mammogram.  Colonoscopy 1 year ago neg per pt.    History: PMHx, Meds, Allergies, PSHx, Social Hx, and POBHx all reviewed and updated.    Pt has no complaints today.    PHYSICAL EXAM:  /65   Pulse 57   Wt 63.5 kg (140 lb)   LMP 2022   BMI 24.03 kg/m²  Not found.  General- NAD, alert and oriented, appropriate  Psych- Normal mood, good memory  Neck- No masses, no thyroid enlargement  CV- Regular rhythm, no murnurs  Resp- CTA to bases, no wheezes  Abdomen- Soft, non distended, non tender, no masses    Breast left-  Bilaterally symmetrical, no masses, non tender, no nipple discharge  Breast right- Bilaterally symmetrical, no masses, non tender, no nipple discharge    External genitalia- Normal female, no lesions  Urethra/meatus- Normal, no masses, non tender  Bladder- Normal, no masses, non tender  Vagina- Normal, no atrophy, no lesions, no discharge.  Prolapse: none  Cvx- Normal, no lesions, no discharge, No cervical motion tenderness  Uterus- Normal size, shape & consistency.  Non tender, mobile, & no prolapse  Adnexa- No mass, non tender  Anus/Rectum/Perineum- Normal appearance, no mass, good sphincter tone, no hemorrhoids, no prolapse, Hemoccult NEGATIVE    Lymphatic- No palpable neck, axillary, or groin nodes  Ext- No edema, no cyanosis    Skin- No lesions, no rashes, no acanthosis nigricans      ASSESSMENT and PLAN:    Diagnoses and all orders for this visit:    1. Well woman exam with routine gynecological exam  -     IgP,  Aptima HPV        Preventative:  • BREAST HEALTH- Monthly self breast exam importance and how to reviewed. MMG and/or MRI (prn) reviewed per society guidelines and her individual history. Screen: Already up to date  • CERVICAL CANCER Screening- Reviewed current ASCCP guidelines for screening w and wo cotest HPV, age specific.  Screen: Updated today  • COLON CANCER Screening- Reviewed current medical society guidelines and options.  Screen:  Already up to date  • SMOKING STATUS- pt does use nicotine and/or tobacco.  I reviewed importance of avoiding.  Recommend FU w PCP regarding quitting options if unable to quit wo assistance.  Does  qualify (age 50-80, 20pack yr hx, current or former smoker, quit w/in last 15yrs, no symptoms of lung CA) for low dose CT of chest.  If qualifies, I recommend pt FU w PCP to schedule/monitor screening for lung CA  MYRIAD: Does not qualify.      She understands the importance of having any ordered tests to be performed in a timely fashion.  The risks of not performing them include, but are not limited to, advanced cancer stages, bone loss from osteoporosis and/or subsequent increase in morbidity and/or mortality.  She is encouraged to review her results online and/or contact or office if she has questions.     Follow Up:  Return in about 1 year (around 10/13/2023) for Annual physical.            Karol Wall, DO  10/13/2022    [facility]  [facility] [place] [address]  Dept: [phone]  Dept Fax: [phone]  Loc: [phone]  Loc Fax: [phone]

## 2022-10-13 ENCOUNTER — OFFICE VISIT (OUTPATIENT)
Dept: OBSTETRICS AND GYNECOLOGY | Facility: CLINIC | Age: 47
End: 2022-10-13

## 2022-10-13 VITALS
DIASTOLIC BLOOD PRESSURE: 65 MMHG | SYSTOLIC BLOOD PRESSURE: 104 MMHG | WEIGHT: 140 LBS | HEART RATE: 57 BPM | BODY MASS INDEX: 24.03 KG/M2

## 2022-10-13 DIAGNOSIS — Z01.419 WELL WOMAN EXAM WITH ROUTINE GYNECOLOGICAL EXAM: ICD-10-CM

## 2022-10-13 PROCEDURE — 87624 HPV HI-RISK TYP POOLED RSLT: CPT | Performed by: OBSTETRICS & GYNECOLOGY

## 2022-10-13 PROCEDURE — 99396 PREV VISIT EST AGE 40-64: CPT | Performed by: OBSTETRICS & GYNECOLOGY

## 2022-10-13 PROCEDURE — G0123 SCREEN CERV/VAG THIN LAYER: HCPCS | Performed by: OBSTETRICS & GYNECOLOGY

## 2022-10-20 LAB
CYTOLOGIST CVX/VAG CYTO: NORMAL
CYTOLOGY CVX/VAG DOC CYTO: NORMAL
CYTOLOGY CVX/VAG DOC THIN PREP: NORMAL
DX ICD CODE: NORMAL
HIV 1 & 2 AB SER-IMP: NORMAL
HPV I/H RISK 4 DNA CVX QL PROBE+SIG AMP: NEGATIVE
OTHER STN SPEC: NORMAL
STAT OF ADQ CVX/VAG CYTO-IMP: NORMAL

## 2022-12-01 ENCOUNTER — APPOINTMENT (OUTPATIENT)
Dept: URBAN - METROPOLITAN AREA CLINIC 216 | Age: 47
Setting detail: DERMATOLOGY
End: 2022-12-01

## 2022-12-01 DIAGNOSIS — D22 MELANOCYTIC NEVI: ICD-10-CM

## 2022-12-01 DIAGNOSIS — L82.1 OTHER SEBORRHEIC KERATOSIS: ICD-10-CM

## 2022-12-01 DIAGNOSIS — Z85.820 PERSONAL HISTORY OF MALIGNANT MELANOMA OF SKIN: ICD-10-CM

## 2022-12-01 DIAGNOSIS — L81.4 OTHER MELANIN HYPERPIGMENTATION: ICD-10-CM

## 2022-12-01 DIAGNOSIS — D18.0 HEMANGIOMA: ICD-10-CM

## 2022-12-01 PROBLEM — D23.72 OTHER BENIGN NEOPLASM OF SKIN OF LEFT LOWER LIMB, INCLUDING HIP: Status: ACTIVE | Noted: 2022-12-01

## 2022-12-01 PROBLEM — D18.01 HEMANGIOMA OF SKIN AND SUBCUTANEOUS TISSUE: Status: ACTIVE | Noted: 2022-12-01

## 2022-12-01 PROBLEM — D22.5 MELANOCYTIC NEVI OF TRUNK: Status: ACTIVE | Noted: 2022-12-01

## 2022-12-01 PROCEDURE — 99203 OFFICE O/P NEW LOW 30 MIN: CPT

## 2022-12-01 PROCEDURE — OTHER MIPS QUALITY: OTHER

## 2022-12-01 PROCEDURE — OTHER COUNSELING: OTHER

## 2022-12-01 ASSESSMENT — LOCATION DETAILED DESCRIPTION DERM
LOCATION DETAILED: RIGHT MEDIAL INFERIOR CHEST
LOCATION DETAILED: LEFT MEDIAL SUPERIOR CHEST
LOCATION DETAILED: EPIGASTRIC SKIN
LOCATION DETAILED: RIGHT MEDIAL SUPERIOR CHEST

## 2022-12-01 ASSESSMENT — LOCATION SIMPLE DESCRIPTION DERM
LOCATION SIMPLE: CHEST
LOCATION SIMPLE: ABDOMEN

## 2022-12-01 ASSESSMENT — LOCATION ZONE DERM: LOCATION ZONE: TRUNK

## 2022-12-01 NOTE — PROCEDURE: MIPS QUALITY
Detail Level: Detailed
Quality 110: Preventive Care And Screening: Influenza Immunization: Influenza Immunization Administered during Influenza season
Quality 226: Preventive Care And Screening: Tobacco Use: Screening And Cessation Intervention: Patient screened for tobacco use, is a smoker AND did not received Cessation Counseling for Medical Reasons within the Previous 12 Months
Quality 130: Documentation Of Current Medications In The Medical Record: Current Medications Documented

## 2022-12-01 NOTE — PROCEDURE: COUNSELING
Detail Level: Detailed
Sunscreen Recommendations: I recommended that the patient regularly apply at least an SPF 30 broad spectrum sunscreen when outdoors during the day.
Quality 137: Melanoma: Continuity Of Care - Recall System: Patient information entered into a recall system that includes: target date for the next exam specified AND a process to follow up with patients regarding missed or unscheduled appointments

## 2023-12-04 ENCOUNTER — APPOINTMENT (OUTPATIENT)
Dept: URBAN - METROPOLITAN AREA CLINIC 215 | Age: 48
Setting detail: DERMATOLOGY
End: 2023-12-04

## 2023-12-04 DIAGNOSIS — L82.1 OTHER SEBORRHEIC KERATOSIS: ICD-10-CM

## 2023-12-04 DIAGNOSIS — L81.4 OTHER MELANIN HYPERPIGMENTATION: ICD-10-CM

## 2023-12-04 DIAGNOSIS — D22 MELANOCYTIC NEVI: ICD-10-CM

## 2023-12-04 DIAGNOSIS — Z85.820 PERSONAL HISTORY OF MALIGNANT MELANOMA OF SKIN: ICD-10-CM

## 2023-12-04 DIAGNOSIS — D18.0 HEMANGIOMA: ICD-10-CM

## 2023-12-04 PROBLEM — D22.5 MELANOCYTIC NEVI OF TRUNK: Status: ACTIVE | Noted: 2023-12-04

## 2023-12-04 PROBLEM — D18.01 HEMANGIOMA OF SKIN AND SUBCUTANEOUS TISSUE: Status: ACTIVE | Noted: 2023-12-04

## 2023-12-04 PROBLEM — D23.72 OTHER BENIGN NEOPLASM OF SKIN OF LEFT LOWER LIMB, INCLUDING HIP: Status: ACTIVE | Noted: 2023-12-04

## 2023-12-04 PROCEDURE — OTHER SMOKING CESSATION COUNSELING: OTHER

## 2023-12-04 PROCEDURE — OTHER COUNSELING: OTHER

## 2023-12-04 PROCEDURE — OTHER MIPS QUALITY: OTHER

## 2023-12-04 PROCEDURE — 99213 OFFICE O/P EST LOW 20 MIN: CPT

## 2023-12-04 ASSESSMENT — LOCATION SIMPLE DESCRIPTION DERM: LOCATION SIMPLE: CHEST

## 2023-12-04 ASSESSMENT — LOCATION DETAILED DESCRIPTION DERM
LOCATION DETAILED: LEFT MEDIAL SUPERIOR CHEST
LOCATION DETAILED: RIGHT MEDIAL SUPERIOR CHEST
LOCATION DETAILED: RIGHT MEDIAL INFERIOR CHEST

## 2023-12-04 ASSESSMENT — LOCATION ZONE DERM: LOCATION ZONE: TRUNK

## 2024-12-04 ENCOUNTER — APPOINTMENT (OUTPATIENT)
Dept: URBAN - METROPOLITAN AREA CLINIC 215 | Age: 49
Setting detail: DERMATOLOGY
End: 2024-12-04

## 2024-12-04 DIAGNOSIS — Z85.820 PERSONAL HISTORY OF MALIGNANT MELANOMA OF SKIN: ICD-10-CM

## 2024-12-04 DIAGNOSIS — L82.1 OTHER SEBORRHEIC KERATOSIS: ICD-10-CM

## 2024-12-04 DIAGNOSIS — D22 MELANOCYTIC NEVI: ICD-10-CM

## 2024-12-04 DIAGNOSIS — D18.0 HEMANGIOMA: ICD-10-CM

## 2024-12-04 DIAGNOSIS — L81.4 OTHER MELANIN HYPERPIGMENTATION: ICD-10-CM

## 2024-12-04 PROBLEM — D23.72 OTHER BENIGN NEOPLASM OF SKIN OF LEFT LOWER LIMB, INCLUDING HIP: Status: ACTIVE | Noted: 2024-12-04

## 2024-12-04 PROBLEM — D22.5 MELANOCYTIC NEVI OF TRUNK: Status: ACTIVE | Noted: 2024-12-04

## 2024-12-04 PROBLEM — D18.01 HEMANGIOMA OF SKIN AND SUBCUTANEOUS TISSUE: Status: ACTIVE | Noted: 2024-12-04

## 2024-12-04 PROCEDURE — OTHER COUNSELING: OTHER

## 2024-12-04 PROCEDURE — 99213 OFFICE O/P EST LOW 20 MIN: CPT

## 2024-12-04 ASSESSMENT — LOCATION DETAILED DESCRIPTION DERM
LOCATION DETAILED: RIGHT MEDIAL INFERIOR CHEST
LOCATION DETAILED: RIGHT MEDIAL SUPERIOR CHEST
LOCATION DETAILED: LEFT MEDIAL SUPERIOR CHEST

## 2024-12-04 ASSESSMENT — LOCATION ZONE DERM: LOCATION ZONE: TRUNK

## 2024-12-04 ASSESSMENT — LOCATION SIMPLE DESCRIPTION DERM: LOCATION SIMPLE: CHEST

## 2025-05-22 ENCOUNTER — APPOINTMENT (OUTPATIENT)
Dept: URBAN - METROPOLITAN AREA CLINIC 215 | Age: 50
Setting detail: DERMATOLOGY
End: 2025-05-22

## 2025-05-22 DIAGNOSIS — D18.0 HEMANGIOMA: ICD-10-CM

## 2025-05-22 PROBLEM — D18.01 HEMANGIOMA OF SKIN AND SUBCUTANEOUS TISSUE: Status: ACTIVE | Noted: 2025-05-22

## 2025-05-22 PROCEDURE — 99212 OFFICE O/P EST SF 10 MIN: CPT

## 2025-05-22 PROCEDURE — OTHER COUNSELING: OTHER

## 2025-05-22 ASSESSMENT — LOCATION SIMPLE DESCRIPTION DERM
LOCATION SIMPLE: RIGHT POSTERIOR UPPER ARM
LOCATION SIMPLE: LEFT LOWER BACK

## 2025-05-22 ASSESSMENT — LOCATION DETAILED DESCRIPTION DERM
LOCATION DETAILED: LEFT SUPERIOR LATERAL LOWER BACK
LOCATION DETAILED: RIGHT DISTAL POSTERIOR UPPER ARM

## 2025-05-22 ASSESSMENT — LOCATION ZONE DERM
LOCATION ZONE: TRUNK
LOCATION ZONE: ARM

## (undated) DEVICE — SOL IRRG H2O PL/BG 1000ML STRL

## (undated) DEVICE — COLON KIT: Brand: MEDLINE INDUSTRIES, INC.

## (undated) DEVICE — Device: Brand: DEFENDO AIR/WATER/SUCTION AND BIOPSY VALVE